# Patient Record
Sex: MALE | Race: ASIAN | NOT HISPANIC OR LATINO | URBAN - METROPOLITAN AREA
[De-identification: names, ages, dates, MRNs, and addresses within clinical notes are randomized per-mention and may not be internally consistent; named-entity substitution may affect disease eponyms.]

---

## 2017-02-02 ENCOUNTER — EMERGENCY (EMERGENCY)
Facility: HOSPITAL | Age: 42
LOS: 1 days | Discharge: HOME | End: 2017-02-02
Attending: EMERGENCY MEDICINE
Payer: MEDICAID

## 2017-02-02 VITALS
RESPIRATION RATE: 16 BRPM | WEIGHT: 182.98 LBS | TEMPERATURE: 98 F | SYSTOLIC BLOOD PRESSURE: 150 MMHG | OXYGEN SATURATION: 100 % | HEIGHT: 67 IN | DIASTOLIC BLOOD PRESSURE: 89 MMHG | HEART RATE: 95 BPM
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
## 2017-02-02 DIAGNOSIS — E11.9 TYPE 2 DIABETES MELLITUS WITHOUT COMPLICATIONS: ICD-10-CM
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
## 2017-02-02 DIAGNOSIS — M79.1 MYALGIA: ICD-10-CM

## 2017-02-02 PROCEDURE — 73030 X-RAY EXAM OF SHOULDER: CPT

## 2017-02-02 PROCEDURE — 99283 EMERGENCY DEPT VISIT LOW MDM: CPT | Mod: 25

## 2017-02-02 PROCEDURE — 73030 X-RAY EXAM OF SHOULDER: CPT | Mod: 26,RT

## 2017-02-02 PROCEDURE — 99283 EMERGENCY DEPT VISIT LOW MDM: CPT

## 2017-02-02 PROCEDURE — 96372 THER/PROPH/DIAG INJ SC/IM: CPT

## 2017-02-02 RX ORDER — KETOROLAC TROMETHAMINE 30 MG/ML
30 SYRINGE (ML) INJECTION ONCE
Qty: 0 | Refills: 0 | Status: DISCONTINUED | OUTPATIENT
Start: 2017-02-02 | End: 2017-02-02

## 2017-02-02 RX ORDER — DICLOFENAC SODIUM 30 MG/G
2 GEL TOPICAL
Qty: 1 | Refills: 0 | OUTPATIENT
Start: 2017-02-02 | End: 2017-02-07

## 2017-02-02 RX ADMIN — Medication 30 MILLIGRAM(S): at 11:53

## 2017-02-02 RX ADMIN — Medication 30 MILLIGRAM(S): at 12:33

## 2017-02-02 NOTE — ED PROVIDER NOTE - NS ED MD SCRIBE ATTENDING SCRIBE SECTIONS
PAST MEDICAL/SURGICAL/SOCIAL HISTORY/PHYSICAL EXAM/HIV/DISPOSITION/HISTORY OF PRESENT ILLNESS/VITAL SIGNS( Pullset)/REVIEW OF SYSTEMS

## 2017-02-02 NOTE — ED PROVIDER NOTE - ATTENDING CONTRIBUTION TO CARE
pateint with fall and muscular pain. on exam full range of motion of both arms, no visible bruising. muscular tenderness to palpation on exam.

## 2017-02-02 NOTE — ED PROVIDER NOTE - PROGRESS NOTE DETAILS
The scribes documentation has been prepared under my direct and personally reviewed by me in its entirety. I confirm that the note above accurately reflects all work, treatment, procedures, and medical decision making performed by me [Deyanira Melchor NP]. Pt confirms moderate improvement, confirmed PMD writing Mobic, pt advised to switch to scheduled IBU if Mobic is not working, discussed xray and follow up with ortho.

## 2017-02-02 NOTE — ED PROVIDER NOTE - OBJECTIVE STATEMENT
43 y/o male with PMHx pf DM presents to the ED c/o upper shoulder pain s/p mechanical fall yesterday. Pt reports he tripped and fell backward on his back and now pt with upper shoulder pain. Pt has not taken any medications for pain relief. Pt went to PMD yesterday who gave him an shot or pain medications to mild relief and a Rx of unknown pain meds to take at home to no relief. Pt denies numbness, tingling, weakness, Hx of CA, or any other complaints. NKDA.

## 2017-02-02 NOTE — ED PROVIDER NOTE - MEDICAL DECISION MAKING DETAILS
Pt with upper shoulder pain s/p fall yesterday. Will get Xray, low suspicion for fracture. Plan to give Toradol, and f/u with PMD. Awaiting what pain meds pt is taking and f/u with pain management. Pt with upper shoulder pain s/p fall yesterday. Will get Xray, low suspicion for fracture or dislocation. Plan to give Toradol, and f/u with PMD. Awaiting what pain meds pt is taking and f/u with pain management.

## 2018-07-22 ENCOUNTER — EMERGENCY (EMERGENCY)
Facility: HOSPITAL | Age: 43
LOS: 1 days | Discharge: HOME | End: 2018-07-22
Attending: EMERGENCY MEDICINE | Admitting: EMERGENCY MEDICINE
Payer: MEDICAID

## 2018-07-22 VITALS
RESPIRATION RATE: 18 BRPM | OXYGEN SATURATION: 100 % | HEART RATE: 89 BPM | SYSTOLIC BLOOD PRESSURE: 116 MMHG | TEMPERATURE: 98 F | DIASTOLIC BLOOD PRESSURE: 75 MMHG

## 2018-07-22 VITALS
TEMPERATURE: 98 F | HEART RATE: 78 BPM | DIASTOLIC BLOOD PRESSURE: 66 MMHG | RESPIRATION RATE: 16 BRPM | SYSTOLIC BLOOD PRESSURE: 136 MMHG | OXYGEN SATURATION: 100 %

## 2018-07-22 PROCEDURE — 73590 X-RAY EXAM OF LOWER LEG: CPT | Mod: 26,RT

## 2018-07-22 PROCEDURE — 73560 X-RAY EXAM OF KNEE 1 OR 2: CPT | Mod: 26,RT

## 2018-07-22 PROCEDURE — 73552 X-RAY EXAM OF FEMUR 2/>: CPT | Mod: 26,RT

## 2018-07-22 PROCEDURE — 99285 EMERGENCY DEPT VISIT HI MDM: CPT | Mod: 25

## 2018-07-22 RX ORDER — OXYCODONE HYDROCHLORIDE 5 MG/1
5 TABLET ORAL ONCE
Qty: 0 | Refills: 0 | Status: DISCONTINUED | OUTPATIENT
Start: 2018-07-22 | End: 2018-07-22

## 2018-07-22 RX ORDER — IBUPROFEN 200 MG
600 TABLET ORAL ONCE
Qty: 0 | Refills: 0 | Status: COMPLETED | OUTPATIENT
Start: 2018-07-22 | End: 2018-07-22

## 2018-07-22 RX ORDER — NICOTINE POLACRILEX 2 MG
1 GUM BUCCAL ONCE
Qty: 0 | Refills: 0 | Status: COMPLETED | OUTPATIENT
Start: 2018-07-22 | End: 2018-07-22

## 2018-07-22 RX ORDER — ACETAMINOPHEN 500 MG
975 TABLET ORAL ONCE
Qty: 0 | Refills: 0 | Status: COMPLETED | OUTPATIENT
Start: 2018-07-22 | End: 2018-07-22

## 2018-07-22 RX ADMIN — Medication 600 MILLIGRAM(S): at 05:46

## 2018-07-22 RX ADMIN — Medication 975 MILLIGRAM(S): at 03:25

## 2018-07-22 RX ADMIN — Medication 1 PATCH: at 11:09

## 2018-07-22 RX ADMIN — Medication 600 MILLIGRAM(S): at 03:25

## 2018-07-22 RX ADMIN — OXYCODONE HYDROCHLORIDE 5 MILLIGRAM(S): 5 TABLET ORAL at 11:14

## 2018-07-22 RX ADMIN — OXYCODONE HYDROCHLORIDE 5 MILLIGRAM(S): 5 TABLET ORAL at 05:46

## 2018-07-22 NOTE — ED PROVIDER NOTE - MEDICAL DECISION MAKING DETAILS
43M presenting with right knee pain s/p trip and fall while intoxicated. Will obtain XR, pain control and reassess

## 2018-07-22 NOTE — ED PROVIDER NOTE - CARE PLAN
Principal Discharge DX:	Patella fracture  Assessment and plan of treatment:	Ortho consulted, pt is to f/u with Ortho OP, Dr. Didier Pickett.

## 2018-07-22 NOTE — ED ADULT TRIAGE NOTE - CHIEF COMPLAINT QUOTE
Pt DENISA MORTENSEN from the street. c/o pain on Right Knee s/p Slip and Fall, swelling noted, Landed on affected area possible dislocated. AOB noted Rubén Hitting his head LOC CP SOB palpitation prior and after the fall

## 2018-07-22 NOTE — ED PROVIDER NOTE - OBJECTIVE STATEMENT
43M with hx of DM presenting with right knee pain. States he was just at a party, had a few drinks and on the way home, tripped and fell onto the right knee. Denies any head trauma, LOC, blood thinners. States he has been able to ambulate on the leg however, pain has been worsening. Has not taken anything for the pain thus far. Denies fever, chills, cp, sob, n/v/d, dizziness, headache, dysuria

## 2018-07-22 NOTE — ED ADULT NURSE NOTE - CHIEF COMPLAINT
The patient is a 43y Male complaining of slip and fall on right knee. Pt c/o right leg pain. Pt reports was at a birthday party and drank a lot of alcohol, unknown amount, last drink 2 hours ago. Denies everyday etoh use. Reports pmhx The patient is a 43y Male complaining of slip and fall on right knee. Pt c/o right leg pain. Pt reports was at a birthday party and drank a lot of alcohol, unknown amount, last drink 2 hours ago. Denies everyday etoh use. Reports pmhx diabetes. The patient is a 43y Male complaining of slip and fall on right knee. Pt c/o right leg pain. Pt reports was at a birthday party and drank a lot of alcohol, unknown amount, last drink 2 hours ago. Denies everyday etoh use. Reports pmhx diabetes, only took morning insulin dose.

## 2018-07-22 NOTE — ED PROVIDER NOTE - PROGRESS NOTE DETAILS
Dr. Phelan: pt signed out to me from Dr. Levi to await ortho recommendations; ortho recommending outpatient follow up, no acute intervention at this time; knee immobilizer placed and pt able to ambulate with crutches in ED

## 2018-07-22 NOTE — ED PROVIDER NOTE - ATTENDING CONTRIBUTION TO CARE
43M p/w R knee pain s/p fall while at a party and having a few drinks, happened a few hours ago.  Mild swelling, (+) deformity, was able to walk on it, good pulses, no other injury.  Here with sister who can take him home.  Found to have patella fx - ortho c/s - they want to wait until 7am when attg phys can make recc's, will also use that time to obs to sobriety, if clinically sober at that point can d/c home with crutches and f/u as outpt.   VS:  unremarkable    GEN -mild distress R knee pain, intoxicated; A+O x3   HEAD - NC/AT     ENT - PEERL, EOMI, mucous membranes  moist , no discharge      NECK: Neck supple, non-tender without lymphadenopathy, no masses, no JVD  PULM - CTA b/l,  symmetric breath sounds  COR -  normal heart sounds    ABD - , ND, NT, soft,  BACK - no CVA tenderness, nontender spine     EXTREMS - no edema, no deformity, warm and well perfused  except for R knee (+)deformity (+)swelling, held flexed, unable to extend.  R hip nontender, pelvis stable, R ankle nontender no deformity, distal N/V/T intact.  skin intact.   SKIN - no rash or bruising      NEUROLOGIC - alert, CN 2-12 intact, sensation nl, motor no focal deficit.

## 2018-07-22 NOTE — CONSULT NOTE ADULT - SUBJECTIVE AND OBJECTIVE BOX
Orthopaedic Surgery Consult Note    Patient is a 43y old  Male who presents with a chief complaint of right knee pain s/p fall while intoxicated. No numbness/tingling or paresthesias. There is significant swelling over patella. He was able to immediately ambulate after the fall but now is in too much pain to bend knee.      PAST MEDICAL & SURGICAL HISTORY:  No pertinent past medical history  No significant past surgical history    [] No significant past history as reviewed with the patient and family    FAMILY HISTORY:    [] Family history not pertinent as reviewed with the patient and family    SOCIAL HISTORY:    MEDICATIONS  (STANDING):    MEDICATIONS  (PRN):    Allergies    No Known Allergies    Intolerances        REVIEW OF SYSTEMS  [x] All review of systems negative except for those marked.      Vital Signs Last 24 Hrs  T(C): 36.6 (22 Jul 2018 01:06), Max: 36.6 (22 Jul 2018 01:06)  T(F): 97.9 (22 Jul 2018 01:06), Max: 97.9 (22 Jul 2018 01:06)  HR: 96 (22 Jul 2018 02:14) (89 - 96)  BP: 125/57 (22 Jul 2018 02:14) (116/75 - 125/57)  BP(mean): --  RR: 18 (22 Jul 2018 02:14) (18 - 18)  SpO2: 100% (22 Jul 2018 02:14) (100% - 100%)      PHYSICAL EXAM:  NAD  RLE:   - skin intact, swelling over medial and lateral aspects of knee with ecchymosis  - Motor TA/EHL/FHL intact  - SILT in L2-S1  - DP/PT palpable  - unable to SLR     IMAGING STUDIES: Right patella fracture    43y Male with right patella fracture  - Patient will require operative fixation of patella  - Patient placed in bulky Valero dressing and knee immobilizer  - Needs to work with PT for crutch training  - If patient is admitted, please obtain pre-op labs and medically clear for surgery  - Otherwise, follow-up as outpatient with Dr. Pickett within 1 week to discuss operative planning Orthopaedic Surgery Consult Note    Patient is a 43y old  Male who presents with a chief complaint of right knee pain s/p fall while intoxicated. No numbness/tingling or paresthesias. There is significant swelling over patella. He was able to immediately ambulate after the fall but now is in too much pain to bend knee.      PAST MEDICAL & SURGICAL HISTORY:  No pertinent past medical history  No significant past surgical history    [] No significant past history as reviewed with the patient and family    FAMILY HISTORY:    [] Family history not pertinent as reviewed with the patient and family    SOCIAL HISTORY:    MEDICATIONS  (STANDING):    MEDICATIONS  (PRN):    Allergies    No Known Allergies    Intolerances        REVIEW OF SYSTEMS  [x] All review of systems negative except for those marked.      Vital Signs Last 24 Hrs  T(C): 36.6 (22 Jul 2018 01:06), Max: 36.6 (22 Jul 2018 01:06)  T(F): 97.9 (22 Jul 2018 01:06), Max: 97.9 (22 Jul 2018 01:06)  HR: 96 (22 Jul 2018 02:14) (89 - 96)  BP: 125/57 (22 Jul 2018 02:14) (116/75 - 125/57)  BP(mean): --  RR: 18 (22 Jul 2018 02:14) (18 - 18)  SpO2: 100% (22 Jul 2018 02:14) (100% - 100%)      PHYSICAL EXAM:  NAD  RLE:   - skin intact, swelling over medial and lateral aspects of knee with ecchymosis  - Motor TA/EHL/FHL intact  - SILT in L2-S1  - DP/PT palpable  - unable to SLR     IMAGING STUDIES: Right patella fracture    43y Male with right patella fracture  - Patient will require operative fixation of patella  - Patient placed in bulky Valero dressing and knee immobilizer  - Crutch training  - Follow-up as outpatient with Dr. Pickett within 1 week to discuss operative planning

## 2018-07-22 NOTE — ED ADULT NURSE NOTE - OBJECTIVE STATEMENT
Float RN: Received pt in trauma A, pt A&Ox3, +AOB, respirations even and unlabored b/l. Abdomen soft, nondistended, nontender. IVL 20g Angiocath placed on right forearm. Awaiting MD nguyễn. Report endorsed to primary RN. Float RN: Received pt in trauma A, pt A&Ox3, +AOB, respirations even and unlabored b/l. Abdomen soft, nondistended, nontender. Pt unable to straighten right knee. IVL 20g Angiocath placed on right forearm. Awaiting MD nguyễn. Report endorsed to primary RN.

## 2018-07-23 PROBLEM — Z00.00 ENCOUNTER FOR PREVENTIVE HEALTH EXAMINATION: Status: ACTIVE | Noted: 2018-07-23

## 2018-07-23 PROBLEM — M25.561 ACUTE PAIN OF RIGHT KNEE: Status: ACTIVE | Noted: 2018-07-23

## 2018-07-24 ENCOUNTER — APPOINTMENT (OUTPATIENT)
Dept: ORTHOPEDIC SURGERY | Facility: CLINIC | Age: 43
End: 2018-07-24
Payer: MEDICAID

## 2018-07-24 VITALS
WEIGHT: 182 LBS | HEIGHT: 66 IN | HEART RATE: 98 BPM | BODY MASS INDEX: 29.25 KG/M2 | SYSTOLIC BLOOD PRESSURE: 146 MMHG | DIASTOLIC BLOOD PRESSURE: 86 MMHG

## 2018-07-24 DIAGNOSIS — Z86.39 PERSONAL HISTORY OF OTHER ENDOCRINE, NUTRITIONAL AND METABOLIC DISEASE: ICD-10-CM

## 2018-07-24 DIAGNOSIS — Z78.9 OTHER SPECIFIED HEALTH STATUS: ICD-10-CM

## 2018-07-24 DIAGNOSIS — F17.200 NICOTINE DEPENDENCE, UNSPECIFIED, UNCOMPLICATED: ICD-10-CM

## 2018-07-24 DIAGNOSIS — M25.561 PAIN IN RIGHT KNEE: ICD-10-CM

## 2018-07-24 PROCEDURE — 99204 OFFICE O/P NEW MOD 45 MIN: CPT

## 2018-07-24 PROCEDURE — 73564 X-RAY EXAM KNEE 4 OR MORE: CPT | Mod: RT

## 2018-07-24 RX ORDER — INSULIN GLARGINE 100 [IU]/ML
100 INJECTION, SOLUTION SUBCUTANEOUS
Refills: 0 | Status: ACTIVE | COMMUNITY

## 2018-07-26 ENCOUNTER — TRANSCRIPTION ENCOUNTER (OUTPATIENT)
Age: 43
End: 2018-07-26

## 2018-07-26 ENCOUNTER — OUTPATIENT (OUTPATIENT)
Dept: OUTPATIENT SERVICES | Facility: HOSPITAL | Age: 43
LOS: 1 days | End: 2018-07-26
Payer: MEDICAID

## 2018-07-26 VITALS
HEIGHT: 66 IN | DIASTOLIC BLOOD PRESSURE: 84 MMHG | TEMPERATURE: 98 F | SYSTOLIC BLOOD PRESSURE: 132 MMHG | OXYGEN SATURATION: 98 % | HEART RATE: 83 BPM | WEIGHT: 184.97 LBS | RESPIRATION RATE: 16 BRPM

## 2018-07-26 DIAGNOSIS — R06.83 SNORING: ICD-10-CM

## 2018-07-26 DIAGNOSIS — S82.044A NONDISPLACED COMMINUTED FRACTURE OF RIGHT PATELLA, INITIAL ENCOUNTER FOR CLOSED FRACTURE: ICD-10-CM

## 2018-07-26 DIAGNOSIS — E11.9 TYPE 2 DIABETES MELLITUS WITHOUT COMPLICATIONS: ICD-10-CM

## 2018-07-26 LAB
BLD GP AB SCN SERPL QL: NEGATIVE — SIGNIFICANT CHANGE UP
BUN SERPL-MCNC: 37 MG/DL — HIGH (ref 7–23)
CALCIUM SERPL-MCNC: 9.4 MG/DL — SIGNIFICANT CHANGE UP (ref 8.4–10.5)
CHLORIDE SERPL-SCNC: 106 MMOL/L — SIGNIFICANT CHANGE UP (ref 98–107)
CO2 SERPL-SCNC: 23 MMOL/L — SIGNIFICANT CHANGE UP (ref 22–31)
CREAT SERPL-MCNC: 2.25 MG/DL — HIGH (ref 0.5–1.3)
GLUCOSE SERPL-MCNC: 130 MG/DL — HIGH (ref 70–99)
HBA1C BLD-MCNC: 6.8 % — HIGH (ref 4–5.6)
HCT VFR BLD CALC: 34.3 % — LOW (ref 39–50)
HGB BLD-MCNC: 10.9 G/DL — LOW (ref 13–17)
MCHC RBC-ENTMCNC: 26.3 PG — LOW (ref 27–34)
MCHC RBC-ENTMCNC: 31.8 % — LOW (ref 32–36)
MCV RBC AUTO: 82.9 FL — SIGNIFICANT CHANGE UP (ref 80–100)
NRBC # FLD: 0 — SIGNIFICANT CHANGE UP
PLATELET # BLD AUTO: 312 K/UL — SIGNIFICANT CHANGE UP (ref 150–400)
PMV BLD: 11.5 FL — SIGNIFICANT CHANGE UP (ref 7–13)
POTASSIUM SERPL-MCNC: 4.6 MMOL/L — SIGNIFICANT CHANGE UP (ref 3.5–5.3)
POTASSIUM SERPL-SCNC: 4.6 MMOL/L — SIGNIFICANT CHANGE UP (ref 3.5–5.3)
RBC # BLD: 4.14 M/UL — LOW (ref 4.2–5.8)
RBC # FLD: 14 % — SIGNIFICANT CHANGE UP (ref 10.3–14.5)
RH IG SCN BLD-IMP: POSITIVE — SIGNIFICANT CHANGE UP
SODIUM SERPL-SCNC: 141 MMOL/L — SIGNIFICANT CHANGE UP (ref 135–145)
WBC # BLD: 8.46 K/UL — SIGNIFICANT CHANGE UP (ref 3.8–10.5)
WBC # FLD AUTO: 8.46 K/UL — SIGNIFICANT CHANGE UP (ref 3.8–10.5)

## 2018-07-26 PROCEDURE — 93010 ELECTROCARDIOGRAM REPORT: CPT

## 2018-07-26 RX ORDER — SODIUM CHLORIDE 9 MG/ML
3 INJECTION INTRAMUSCULAR; INTRAVENOUS; SUBCUTANEOUS EVERY 8 HOURS
Qty: 0 | Refills: 0 | Status: DISCONTINUED | OUTPATIENT
Start: 2018-07-27 | End: 2018-08-11

## 2018-07-26 RX ORDER — SODIUM CHLORIDE 9 MG/ML
1000 INJECTION, SOLUTION INTRAVENOUS
Qty: 0 | Refills: 0 | Status: DISCONTINUED | OUTPATIENT
Start: 2018-07-27 | End: 2018-08-11

## 2018-07-26 NOTE — H&P PST ADULT - NSANTHOSAYNRD_GEN_A_CORE
No. TERESSA screening performed.  STOP BANG Legend: 0-2 = LOW Risk; 3-4 = INTERMEDIATE Risk; 5-8 = HIGH Risk/never been tested

## 2018-07-26 NOTE — H&P PST ADULT - HISTORY OF PRESENT ILLNESS
43 year old male with PMH: diabetes, hyperlipidemia. S/P fall from ladder, denies LOC, fell on right side, on the knee on 7/21/18.  Pt is scheduled for right open reduction internal fixation partial patellectomy on 7/27/18.

## 2018-07-26 NOTE — H&P PST ADULT - PROBLEM SELECTOR PLAN 1
Pt is scheduled for right open reduction internal fixation partial patellectomy on 7/27/18.   Pre-op instructions given, patient verbalized understanding.   Pepcid given to patient for GI prophylaxis.   Chlorohexidine wash provided, instructions given.     Surgeon requested medical evaluation, copy of evaluation in the chart.  Case discussed with Dr. Sharma.

## 2018-07-26 NOTE — H&P PST ADULT - MUSCULOSKELETAL
details… detailed exam joint erythema/right knee/no calf tenderness/decreased ROM due to pain/decreased ROM/joint swelling/diminished strength

## 2018-07-26 NOTE — H&P PST ADULT - PROBLEM SELECTOR PLAN 2
PMH diabetes, OR booking notified.   Pt instructed, night before surgery, reduce Lantus dose by 20% and morning of the surgery, reduce Lantus dose by 50%.  Pt verbalized understanding.

## 2018-07-26 NOTE — H&P PST ADULT - NEGATIVE MUSCULOSKELETAL SYMPTOMS
no arthritis/no muscle cramps/no arm pain L/no back pain/no neck pain/no arm pain R/no myalgia/no stiffness/no muscle weakness/no joint swelling

## 2018-07-26 NOTE — H&P PST ADULT - RS GEN PE MLT RESP DETAILS PC
airway patent/no rales/respirations non-labored/clear to auscultation bilaterally/no rhonchi/breath sounds equal/no wheezes/good air movement

## 2018-07-26 NOTE — H&P PST ADULT - PMH
Diabetes  TYPE II, insulin dependent, 2014  Hyperlipidemia    Nondisplaced comminuted fracture of right patella, initial encounter for closed fracture Diabetes  TYPE II, insulin dependent, 2014  Hyperlipidemia  PCP prescribed statin, non-compliant, refusing to take  Hypertension  not on medication  Nondisplaced comminuted fracture of right patella, initial encounter for closed fracture

## 2018-07-26 NOTE — H&P PST ADULT - NEGATIVE ENMT SYMPTOMS
no hearing difficulty/no ear pain/no nasal discharge/no tinnitus/no vertigo/no sinus symptoms/no nasal congestion/no throat pain/no dysphagia

## 2018-07-26 NOTE — ASU PATIENT PROFILE, ADULT - PMH
Diabetes  TYPE II, insulin dependent, 2014  Hyperlipidemia  PCP prescribed statin, non-compliant, refusing to take  Hypertension  not on medication  Nondisplaced comminuted fracture of right patella, initial encounter for closed fracture

## 2018-07-27 ENCOUNTER — RESULT REVIEW (OUTPATIENT)
Age: 43
End: 2018-07-27

## 2018-07-27 ENCOUNTER — APPOINTMENT (OUTPATIENT)
Dept: ORTHOPEDIC SURGERY | Facility: HOSPITAL | Age: 43
End: 2018-07-27

## 2018-07-27 ENCOUNTER — OUTPATIENT (OUTPATIENT)
Dept: OUTPATIENT SERVICES | Facility: HOSPITAL | Age: 43
LOS: 1 days | Discharge: HOME | End: 2018-07-27
Payer: MEDICAID

## 2018-07-27 VITALS
OXYGEN SATURATION: 100 % | RESPIRATION RATE: 16 BRPM | WEIGHT: 184.97 LBS | DIASTOLIC BLOOD PRESSURE: 84 MMHG | HEART RATE: 87 BPM | TEMPERATURE: 98 F | HEIGHT: 66 IN | SYSTOLIC BLOOD PRESSURE: 123 MMHG

## 2018-07-27 VITALS
DIASTOLIC BLOOD PRESSURE: 78 MMHG | OXYGEN SATURATION: 100 % | RESPIRATION RATE: 18 BRPM | HEART RATE: 90 BPM | SYSTOLIC BLOOD PRESSURE: 177 MMHG | TEMPERATURE: 98 F

## 2018-07-27 DIAGNOSIS — S82.044A NONDISPLACED COMMINUTED FRACTURE OF RIGHT PATELLA, INITIAL ENCOUNTER FOR CLOSED FRACTURE: ICD-10-CM

## 2018-07-27 PROBLEM — E78.5 HYPERLIPIDEMIA, UNSPECIFIED: Chronic | Status: ACTIVE | Noted: 2018-07-26

## 2018-07-27 PROBLEM — I10 ESSENTIAL (PRIMARY) HYPERTENSION: Chronic | Status: ACTIVE | Noted: 2018-07-26

## 2018-07-27 LAB
GLUCOSE BLDC GLUCOMTR-MCNC: 119 MG/DL — HIGH (ref 70–99)
GLUCOSE BLDC GLUCOMTR-MCNC: 88 MG/DL — SIGNIFICANT CHANGE UP (ref 70–99)

## 2018-07-27 PROCEDURE — 27524 TREAT KNEECAP FRACTURE: CPT | Mod: RT

## 2018-07-27 PROCEDURE — 88304 TISSUE EXAM BY PATHOLOGIST: CPT | Mod: 26

## 2018-07-27 PROCEDURE — 88311 DECALCIFY TISSUE: CPT | Mod: 26

## 2018-07-27 RX ORDER — KETOROLAC TROMETHAMINE 30 MG/ML
30 SYRINGE (ML) INJECTION ONCE
Qty: 0 | Refills: 0 | Status: DISCONTINUED | OUTPATIENT
Start: 2018-07-27 | End: 2018-07-27

## 2018-07-27 RX ORDER — LABETALOL HCL 100 MG
10 TABLET ORAL ONCE
Qty: 0 | Refills: 0 | Status: COMPLETED | OUTPATIENT
Start: 2018-07-27 | End: 2018-07-27

## 2018-07-27 RX ORDER — HYDROMORPHONE HYDROCHLORIDE 2 MG/ML
0.5 INJECTION INTRAMUSCULAR; INTRAVENOUS; SUBCUTANEOUS
Qty: 0 | Refills: 0 | Status: DISCONTINUED | OUTPATIENT
Start: 2018-07-27 | End: 2018-07-27

## 2018-07-27 RX ORDER — IBUPROFEN 200 MG
1 TABLET ORAL
Qty: 0 | Refills: 0 | COMMUNITY

## 2018-07-27 RX ORDER — ASPIRIN/CALCIUM CARB/MAGNESIUM 324 MG
1 TABLET ORAL
Qty: 84 | Refills: 0 | OUTPATIENT
Start: 2018-07-27 | End: 2018-09-06

## 2018-07-27 RX ORDER — FENTANYL CITRATE 50 UG/ML
25 INJECTION INTRAVENOUS
Qty: 0 | Refills: 0 | Status: DISCONTINUED | OUTPATIENT
Start: 2018-07-27 | End: 2018-07-27

## 2018-07-27 RX ORDER — HYDROMORPHONE HYDROCHLORIDE 2 MG/ML
1 INJECTION INTRAMUSCULAR; INTRAVENOUS; SUBCUTANEOUS ONCE
Qty: 0 | Refills: 0 | Status: DISCONTINUED | OUTPATIENT
Start: 2018-07-27 | End: 2018-07-27

## 2018-07-27 RX ORDER — ONDANSETRON 8 MG/1
4 TABLET, FILM COATED ORAL ONCE
Qty: 0 | Refills: 0 | Status: DISCONTINUED | OUTPATIENT
Start: 2018-07-27 | End: 2018-08-11

## 2018-07-27 RX ORDER — INSULIN GLARGINE 100 [IU]/ML
28 INJECTION, SOLUTION SUBCUTANEOUS
Qty: 0 | Refills: 0 | COMMUNITY

## 2018-07-27 RX ORDER — OXYCODONE HYDROCHLORIDE 5 MG/1
5 TABLET ORAL ONCE
Qty: 0 | Refills: 0 | Status: DISCONTINUED | OUTPATIENT
Start: 2018-07-27 | End: 2018-07-27

## 2018-07-27 RX ORDER — SODIUM CHLORIDE 9 MG/ML
1000 INJECTION, SOLUTION INTRAVENOUS
Qty: 0 | Refills: 0 | Status: DISCONTINUED | OUTPATIENT
Start: 2018-07-27 | End: 2018-08-11

## 2018-07-27 RX ADMIN — HYDROMORPHONE HYDROCHLORIDE 0.5 MILLIGRAM(S): 2 INJECTION INTRAMUSCULAR; INTRAVENOUS; SUBCUTANEOUS at 10:25

## 2018-07-27 RX ADMIN — FENTANYL CITRATE 25 MICROGRAM(S): 50 INJECTION INTRAVENOUS at 09:49

## 2018-07-27 RX ADMIN — HYDROMORPHONE HYDROCHLORIDE 1 MILLIGRAM(S): 2 INJECTION INTRAMUSCULAR; INTRAVENOUS; SUBCUTANEOUS at 13:46

## 2018-07-27 RX ADMIN — OXYCODONE HYDROCHLORIDE 5 MILLIGRAM(S): 5 TABLET ORAL at 11:24

## 2018-07-27 RX ADMIN — FENTANYL CITRATE 25 MICROGRAM(S): 50 INJECTION INTRAVENOUS at 09:43

## 2018-07-27 RX ADMIN — FENTANYL CITRATE 25 MICROGRAM(S): 50 INJECTION INTRAVENOUS at 10:01

## 2018-07-27 RX ADMIN — HYDROMORPHONE HYDROCHLORIDE 0.5 MILLIGRAM(S): 2 INJECTION INTRAMUSCULAR; INTRAVENOUS; SUBCUTANEOUS at 10:24

## 2018-07-27 RX ADMIN — FENTANYL CITRATE 25 MICROGRAM(S): 50 INJECTION INTRAVENOUS at 09:42

## 2018-07-27 RX ADMIN — Medication 30 MILLIGRAM(S): at 12:45

## 2018-07-27 RX ADMIN — Medication 30 MILLIGRAM(S): at 12:30

## 2018-07-27 RX ADMIN — SODIUM CHLORIDE 100 MILLILITER(S): 9 INJECTION, SOLUTION INTRAVENOUS at 09:28

## 2018-07-27 RX ADMIN — HYDROMORPHONE HYDROCHLORIDE 1 MILLIGRAM(S): 2 INJECTION INTRAMUSCULAR; INTRAVENOUS; SUBCUTANEOUS at 14:01

## 2018-07-27 RX ADMIN — FENTANYL CITRATE 25 MICROGRAM(S): 50 INJECTION INTRAVENOUS at 09:33

## 2018-07-27 RX ADMIN — FENTANYL CITRATE 25 MICROGRAM(S): 50 INJECTION INTRAVENOUS at 09:28

## 2018-07-27 RX ADMIN — Medication 10 MILLIGRAM(S): at 10:40

## 2018-07-27 NOTE — ASU DISCHARGE PLAN (ADULT/PEDIATRIC). - MEDICATION SUMMARY - MEDICATIONS TO TAKE
I will START or STAY ON the medications listed below when I get home from the hospital:    Percocet 5/325 oral tablet  -- 1 tab(s) by mouth every 4 hours, As Needed -for severe pain MDD:6   -- Caution federal law prohibits the transfer of this drug to any person other  than the person for whom it was prescribed.  May cause drowsiness.  Alcohol may intensify this effect.  Use care when operating dangerous machinery.  This prescription cannot be refilled.  This product contains acetaminophen.  Do not use  with any other product containing acetaminophen to prevent possible liver damage.  Using more of this medication than prescribed may cause serious breathing problems.    -- Indication: For Pain    Aspirin Enteric Coated 325 mg oral delayed release tablet  -- 1 tab(s) by mouth 2 times a day for DVT ppx  -- Swallow whole.  Do not crush.  Take with food or milk.    -- Indication: For DVT ppx
diffuse

## 2018-07-27 NOTE — BRIEF OPERATIVE NOTE - PROCEDURE
<<-----Click on this checkbox to enter Procedure Partial right patellectomy  07/27/2018    Active  THANIA

## 2018-07-27 NOTE — ASU DISCHARGE PLAN (ADULT/PEDIATRIC). - NURSING INSTRUCTIONS
Keep extremity elevated, apply ice to reduce pain and swelling - 20 minutes on 20 minutes off. Keep dressing dry and clean. Notify MD if red or pus drainge from incision. Narcotic pain medication may cause nausea or constipation. Take medication with food. Increase fluids and fiber intake. 1 Percocet contains 325mg. of Tylenol (ACETAMINOPHEN) . DO NOT EXCEED 3000mg  of Tylenol in a 24 hour period. Make appointment with MD. Keep extremity elevated, apply ice to reduce pain and swelling - 20 minutes on 20 minutes off. Keep dressing dry and clean. Protect right leg till sensation comes back. Notify MD if red or pus drainge from incision. Narcotic pain medication may cause nausea or constipation. Take medication with food. Increase fluids and fiber intake. 1 Percocet contains 325mg. of Tylenol (ACETAMINOPHEN) . DO NOT EXCEED 3000mg  of Tylenol in a 24 hour period. Make appointment with MD. Keep extremity elevated, apply ice to reduce pain and swelling - 20 minutes on 20 minutes off. Keep dressing dry and clean. Protect right leg till sensation comes back. Notify MD if red or pus drainge from incision. Narcotic pain medication may cause nausea or constipation. Take medication with food. Increase fluids and fiber intake. 1 Percocet contains 325mg. of Tylenol (ACETAMINOPHEN) . DO NOT EXCEED 3000mg  of Tylenol in a 24 hour period. Make appointment with MD. Follow up for blood pressure with primary MD.

## 2018-07-27 NOTE — PROGRESS NOTE ADULT - SUBJECTIVE AND OBJECTIVE BOX
ORTHO ATTENDING POST OP    s/p R knee partial patellectomy  Valero Dressing  [place] locked in extension  WBAT R   LE  elevation  ice  ASA  BID  percocet to vivo  f/u 2 weeks  keep brace and dressing, on, clean and dry  until office visit

## 2018-07-27 NOTE — ASU DISCHARGE PLAN (ADULT/PEDIATRIC). - NOTIFY
Swelling that continues/Fever greater than 101/Increased Irritability or Sluggishness/Numbness, color, or temperature change to extremity/Persistent Nausea and Vomiting/Bleeding that does not stop/Unable to Urinate/Pain not relieved by Medications

## 2018-07-27 NOTE — ASU DISCHARGE PLAN (ADULT/PEDIATRIC). - MEDICATION SUMMARY - MEDICATIONS TO CHANGE
I will SWITCH the dose or number of times a day I take the medications listed below when I get home from the hospital:    ibuprofen 800 mg oral tablet  -- 1 tab(s) by mouth 3 times a day, As Needed

## 2018-07-27 NOTE — ASU DISCHARGE PLAN (ADULT/PEDIATRIC). - SPECIAL INSTRUCTIONS
For pain:  mild pain: tylenol 650 mg every 6 hours as needed**  moderate pain: ibuprofen 400 mg every 6 hours as needed  severe pain: percocet 5/325 mg every 6 hours as needed**    **Make sure daily dose of tylenol/acetaminophen is no greater than 4000 mg from all sources    Ice and elevated right lower extremity

## 2018-07-27 NOTE — ASU DISCHARGE PLAN (ADULT/PEDIATRIC). - FOLLOWUP APPOINTMENT CLINIC/PHYSICIAN
Follow up in 2 weeks with Dr. Valderrama, make an appointment if you do not already have one; leave dressing in place and albert locked in extension

## 2018-07-30 PROBLEM — E11.9 TYPE 2 DIABETES MELLITUS WITHOUT COMPLICATIONS: Chronic | Status: ACTIVE | Noted: 2018-07-26

## 2018-07-30 PROBLEM — S82.044A: Chronic | Status: ACTIVE | Noted: 2018-07-26

## 2018-08-01 LAB — SURGICAL PATHOLOGY STUDY: SIGNIFICANT CHANGE UP

## 2018-08-04 ENCOUNTER — EMERGENCY (EMERGENCY)
Facility: HOSPITAL | Age: 43
LOS: 1 days | Discharge: HOME | End: 2018-08-04
Attending: EMERGENCY MEDICINE | Admitting: EMERGENCY MEDICINE
Payer: MEDICAID

## 2018-08-04 VITALS
RESPIRATION RATE: 16 BRPM | HEART RATE: 118 BPM | TEMPERATURE: 99 F | SYSTOLIC BLOOD PRESSURE: 120 MMHG | OXYGEN SATURATION: 97 % | DIASTOLIC BLOOD PRESSURE: 89 MMHG

## 2018-08-04 LAB
ALBUMIN SERPL ELPH-MCNC: 3.1 G/DL — LOW (ref 3.3–5)
ALBUMIN SERPL ELPH-MCNC: 3.3 G/DL — SIGNIFICANT CHANGE UP (ref 3.3–5)
ALBUMIN SERPL ELPH-MCNC: 3.3 G/DL — SIGNIFICANT CHANGE UP (ref 3.3–5)
ALBUMIN SERPL ELPH-MCNC: 4 G/DL — SIGNIFICANT CHANGE UP (ref 3.3–5)
ALP SERPL-CCNC: 67 U/L — SIGNIFICANT CHANGE UP (ref 40–120)
ALP SERPL-CCNC: 68 U/L — SIGNIFICANT CHANGE UP (ref 40–120)
ALP SERPL-CCNC: 68 U/L — SIGNIFICANT CHANGE UP (ref 40–120)
ALP SERPL-CCNC: 84 U/L — SIGNIFICANT CHANGE UP (ref 40–120)
ALT FLD-CCNC: 14 U/L — SIGNIFICANT CHANGE UP (ref 4–41)
ALT FLD-CCNC: 14 U/L — SIGNIFICANT CHANGE UP (ref 4–41)
ALT FLD-CCNC: 16 U/L — SIGNIFICANT CHANGE UP (ref 4–41)
ALT FLD-CCNC: 19 U/L — SIGNIFICANT CHANGE UP (ref 4–41)
APPEARANCE UR: CLEAR — SIGNIFICANT CHANGE UP
AST SERPL-CCNC: 13 U/L — SIGNIFICANT CHANGE UP (ref 4–40)
AST SERPL-CCNC: 14 U/L — SIGNIFICANT CHANGE UP (ref 4–40)
AST SERPL-CCNC: 14 U/L — SIGNIFICANT CHANGE UP (ref 4–40)
AST SERPL-CCNC: 18 U/L — SIGNIFICANT CHANGE UP (ref 4–40)
B-OH-BUTYR SERPL-SCNC: 0.1 MMOL/L — SIGNIFICANT CHANGE UP (ref 0–0.4)
BASE EXCESS BLDV CALC-SCNC: -5.2 MMOL/L — SIGNIFICANT CHANGE UP
BASE EXCESS BLDV CALC-SCNC: -6.6 MMOL/L — SIGNIFICANT CHANGE UP
BASOPHILS # BLD AUTO: 0.02 K/UL — SIGNIFICANT CHANGE UP (ref 0–0.2)
BASOPHILS NFR BLD AUTO: 0.1 % — SIGNIFICANT CHANGE UP (ref 0–2)
BASOPHILS NFR SPEC: 0 % — SIGNIFICANT CHANGE UP (ref 0–2)
BILIRUB SERPL-MCNC: 0.3 MG/DL — SIGNIFICANT CHANGE UP (ref 0.2–1.2)
BILIRUB SERPL-MCNC: 0.4 MG/DL — SIGNIFICANT CHANGE UP (ref 0.2–1.2)
BILIRUB UR-MCNC: NEGATIVE — SIGNIFICANT CHANGE UP
BLASTS # FLD: 0 % — SIGNIFICANT CHANGE UP (ref 0–0)
BLOOD GAS VENOUS - CREATININE: 1.94 MG/DL — HIGH (ref 0.5–1.3)
BLOOD GAS VENOUS - CREATININE: 2.1 MG/DL — HIGH (ref 0.5–1.3)
BLOOD UR QL VISUAL: NEGATIVE — SIGNIFICANT CHANGE UP
BUN SERPL-MCNC: 50 MG/DL — HIGH (ref 7–23)
BUN SERPL-MCNC: 57 MG/DL — HIGH (ref 7–23)
BUN SERPL-MCNC: 57 MG/DL — HIGH (ref 7–23)
BUN SERPL-MCNC: 58 MG/DL — HIGH (ref 7–23)
CALCIUM SERPL-MCNC: 8.3 MG/DL — LOW (ref 8.4–10.5)
CALCIUM SERPL-MCNC: 8.6 MG/DL — SIGNIFICANT CHANGE UP (ref 8.4–10.5)
CALCIUM SERPL-MCNC: 8.7 MG/DL — SIGNIFICANT CHANGE UP (ref 8.4–10.5)
CALCIUM SERPL-MCNC: 9.3 MG/DL — SIGNIFICANT CHANGE UP (ref 8.4–10.5)
CHLORIDE BLDV-SCNC: 106 MMOL/L — SIGNIFICANT CHANGE UP (ref 96–108)
CHLORIDE BLDV-SCNC: 111 MMOL/L — HIGH (ref 96–108)
CHLORIDE SERPL-SCNC: 100 MMOL/L — SIGNIFICANT CHANGE UP (ref 98–107)
CHLORIDE SERPL-SCNC: 103 MMOL/L — SIGNIFICANT CHANGE UP (ref 98–107)
CHLORIDE SERPL-SCNC: 96 MMOL/L — LOW (ref 98–107)
CHLORIDE SERPL-SCNC: 99 MMOL/L — SIGNIFICANT CHANGE UP (ref 98–107)
CO2 SERPL-SCNC: 17 MMOL/L — LOW (ref 22–31)
CO2 SERPL-SCNC: 19 MMOL/L — LOW (ref 22–31)
CO2 SERPL-SCNC: 19 MMOL/L — LOW (ref 22–31)
CO2 SERPL-SCNC: 20 MMOL/L — LOW (ref 22–31)
COLOR SPEC: SIGNIFICANT CHANGE UP
CREAT SERPL-MCNC: 1.92 MG/DL — HIGH (ref 0.5–1.3)
CREAT SERPL-MCNC: 1.99 MG/DL — HIGH (ref 0.5–1.3)
CREAT SERPL-MCNC: 2.09 MG/DL — HIGH (ref 0.5–1.3)
CREAT SERPL-MCNC: 2.12 MG/DL — HIGH (ref 0.5–1.3)
EOSINOPHIL # BLD AUTO: 0 K/UL — SIGNIFICANT CHANGE UP (ref 0–0.5)
EOSINOPHIL NFR BLD AUTO: 0 % — SIGNIFICANT CHANGE UP (ref 0–6)
EOSINOPHIL NFR FLD: 0 % — SIGNIFICANT CHANGE UP (ref 0–6)
ERYTHROCYTE [SEDIMENTATION RATE] IN BLOOD: 102 MM/HR — HIGH (ref 1–15)
GAS PNL BLDV: 131 MMOL/L — LOW (ref 136–146)
GAS PNL BLDV: 135 MMOL/L — LOW (ref 136–146)
GIANT PLATELETS BLD QL SMEAR: PRESENT — SIGNIFICANT CHANGE UP
GLUCOSE BLDV-MCNC: 287 — HIGH (ref 70–99)
GLUCOSE BLDV-MCNC: 396 — HIGH (ref 70–99)
GLUCOSE SERPL-MCNC: 321 MG/DL — HIGH (ref 70–99)
GLUCOSE SERPL-MCNC: 327 MG/DL — HIGH (ref 70–99)
GLUCOSE SERPL-MCNC: 434 MG/DL — HIGH (ref 70–99)
GLUCOSE SERPL-MCNC: 482 MG/DL — CRITICAL HIGH (ref 70–99)
GLUCOSE UR-MCNC: >1000 — SIGNIFICANT CHANGE UP
HBA1C BLD-MCNC: 7 % — HIGH (ref 4–5.6)
HCO3 BLDV-SCNC: 19 MMOL/L — LOW (ref 20–27)
HCO3 BLDV-SCNC: 20 MMOL/L — SIGNIFICANT CHANGE UP (ref 20–27)
HCT VFR BLD CALC: 32.5 % — LOW (ref 39–50)
HCT VFR BLDV CALC: 29.4 % — LOW (ref 39–51)
HCT VFR BLDV CALC: 29.9 % — LOW (ref 39–51)
HGB BLD-MCNC: 10.4 G/DL — LOW (ref 13–17)
HGB BLDV-MCNC: 9.5 G/DL — LOW (ref 13–17)
HGB BLDV-MCNC: 9.7 G/DL — LOW (ref 13–17)
IMM GRANULOCYTES # BLD AUTO: 0.06 # — SIGNIFICANT CHANGE UP
IMM GRANULOCYTES NFR BLD AUTO: 0.4 % — SIGNIFICANT CHANGE UP (ref 0–1.5)
KETONES UR-MCNC: NEGATIVE — SIGNIFICANT CHANGE UP
LACTATE BLDV-MCNC: 2.3 MMOL/L — HIGH (ref 0.5–2)
LACTATE BLDV-MCNC: 2.4 MMOL/L — HIGH (ref 0.5–2)
LEUKOCYTE ESTERASE UR-ACNC: NEGATIVE — SIGNIFICANT CHANGE UP
LYMPHOCYTES # BLD AUTO: 0.33 K/UL — LOW (ref 1–3.3)
LYMPHOCYTES # BLD AUTO: 2.2 % — LOW (ref 13–44)
LYMPHOCYTES NFR SPEC AUTO: 3.5 % — LOW (ref 13–44)
MCHC RBC-ENTMCNC: 25.6 PG — LOW (ref 27–34)
MCHC RBC-ENTMCNC: 32 % — SIGNIFICANT CHANGE UP (ref 32–36)
MCV RBC AUTO: 80 FL — SIGNIFICANT CHANGE UP (ref 80–100)
METAMYELOCYTES # FLD: 0 % — SIGNIFICANT CHANGE UP (ref 0–1)
MICROCYTES BLD QL: SIGNIFICANT CHANGE UP
MONOCYTES # BLD AUTO: 0.08 K/UL — SIGNIFICANT CHANGE UP (ref 0–0.9)
MONOCYTES NFR BLD AUTO: 0.5 % — LOW (ref 2–14)
MONOCYTES NFR BLD: 0 % — LOW (ref 2–9)
MYELOCYTES NFR BLD: 0 % — SIGNIFICANT CHANGE UP (ref 0–0)
NEUTROPHIL AB SER-ACNC: 95.6 % — HIGH (ref 43–77)
NEUTROPHILS # BLD AUTO: 14.73 K/UL — HIGH (ref 1.8–7.4)
NEUTROPHILS NFR BLD AUTO: 96.8 % — HIGH (ref 43–77)
NEUTS BAND # BLD: 0 % — SIGNIFICANT CHANGE UP (ref 0–6)
NITRITE UR-MCNC: NEGATIVE — SIGNIFICANT CHANGE UP
NRBC # FLD: 0 — SIGNIFICANT CHANGE UP
NT-PROBNP SERPL-SCNC: 30.38 PG/ML — SIGNIFICANT CHANGE UP
OTHER - HEMATOLOGY %: 0 — SIGNIFICANT CHANGE UP
OVALOCYTES BLD QL SMEAR: SLIGHT — SIGNIFICANT CHANGE UP
PCO2 BLDV: 39 MMHG — LOW (ref 41–51)
PCO2 BLDV: 40 MMHG — LOW (ref 41–51)
PH BLDV: 7.3 PH — LOW (ref 7.32–7.43)
PH BLDV: 7.32 PH — SIGNIFICANT CHANGE UP (ref 7.32–7.43)
PH UR: 5.5 — SIGNIFICANT CHANGE UP (ref 4.6–8)
PLATELET # BLD AUTO: 368 K/UL — SIGNIFICANT CHANGE UP (ref 150–400)
PLATELET COUNT - ESTIMATE: NORMAL — SIGNIFICANT CHANGE UP
PMV BLD: 11 FL — SIGNIFICANT CHANGE UP (ref 7–13)
PO2 BLDV: 42 MMHG — HIGH (ref 35–40)
PO2 BLDV: 47 MMHG — HIGH (ref 35–40)
POTASSIUM BLDV-SCNC: 4.6 MMOL/L — HIGH (ref 3.4–4.5)
POTASSIUM BLDV-SCNC: 5.1 MMOL/L — HIGH (ref 3.4–4.5)
POTASSIUM SERPL-MCNC: 4.9 MMOL/L — SIGNIFICANT CHANGE UP (ref 3.5–5.3)
POTASSIUM SERPL-MCNC: 5.3 MMOL/L — SIGNIFICANT CHANGE UP (ref 3.5–5.3)
POTASSIUM SERPL-MCNC: 5.4 MMOL/L — HIGH (ref 3.5–5.3)
POTASSIUM SERPL-MCNC: 5.7 MMOL/L — HIGH (ref 3.5–5.3)
POTASSIUM SERPL-SCNC: 4.9 MMOL/L — SIGNIFICANT CHANGE UP (ref 3.5–5.3)
POTASSIUM SERPL-SCNC: 5.3 MMOL/L — SIGNIFICANT CHANGE UP (ref 3.5–5.3)
POTASSIUM SERPL-SCNC: 5.4 MMOL/L — HIGH (ref 3.5–5.3)
POTASSIUM SERPL-SCNC: 5.7 MMOL/L — HIGH (ref 3.5–5.3)
PROMYELOCYTES # FLD: 0 % — SIGNIFICANT CHANGE UP (ref 0–0)
PROT SERPL-MCNC: 6.5 G/DL — SIGNIFICANT CHANGE UP (ref 6–8.3)
PROT SERPL-MCNC: 6.7 G/DL — SIGNIFICANT CHANGE UP (ref 6–8.3)
PROT SERPL-MCNC: 6.7 G/DL — SIGNIFICANT CHANGE UP (ref 6–8.3)
PROT SERPL-MCNC: 8.1 G/DL — SIGNIFICANT CHANGE UP (ref 6–8.3)
PROT UR-MCNC: 30 MG/DL — HIGH
RBC # BLD: 4.06 M/UL — LOW (ref 4.2–5.8)
RBC # FLD: 13.4 % — SIGNIFICANT CHANGE UP (ref 10.3–14.5)
RBC CASTS # UR COMP ASSIST: SIGNIFICANT CHANGE UP (ref 0–?)
SAO2 % BLDV: 68.9 % — SIGNIFICANT CHANGE UP (ref 60–85)
SAO2 % BLDV: 75.9 % — SIGNIFICANT CHANGE UP (ref 60–85)
SODIUM SERPL-SCNC: 131 MMOL/L — LOW (ref 135–145)
SODIUM SERPL-SCNC: 133 MMOL/L — LOW (ref 135–145)
SODIUM SERPL-SCNC: 134 MMOL/L — LOW (ref 135–145)
SODIUM SERPL-SCNC: 136 MMOL/L — SIGNIFICANT CHANGE UP (ref 135–145)
SP GR SPEC: 1.01 — SIGNIFICANT CHANGE UP (ref 1–1.04)
SQUAMOUS # UR AUTO: SIGNIFICANT CHANGE UP
UROBILINOGEN FLD QL: NORMAL MG/DL — SIGNIFICANT CHANGE UP
VARIANT LYMPHS # BLD: 0.9 % — SIGNIFICANT CHANGE UP
WBC # BLD: 15.22 K/UL — HIGH (ref 3.8–10.5)
WBC # FLD AUTO: 15.22 K/UL — HIGH (ref 3.8–10.5)
WBC UR QL: SIGNIFICANT CHANGE UP (ref 0–?)

## 2018-08-04 PROCEDURE — 99285 EMERGENCY DEPT VISIT HI MDM: CPT | Mod: GC

## 2018-08-04 PROCEDURE — 99218: CPT

## 2018-08-04 PROCEDURE — 71045 X-RAY EXAM CHEST 1 VIEW: CPT | Mod: 26

## 2018-08-04 PROCEDURE — 93971 EXTREMITY STUDY: CPT | Mod: 26,RT

## 2018-08-04 RX ORDER — KETOROLAC TROMETHAMINE 30 MG/ML
30 SYRINGE (ML) INJECTION ONCE
Qty: 0 | Refills: 0 | Status: DISCONTINUED | OUTPATIENT
Start: 2018-08-04 | End: 2018-08-04

## 2018-08-04 RX ORDER — SODIUM CHLORIDE 9 MG/ML
1000 INJECTION INTRAMUSCULAR; INTRAVENOUS; SUBCUTANEOUS ONCE
Qty: 0 | Refills: 0 | Status: COMPLETED | OUTPATIENT
Start: 2018-08-04 | End: 2018-08-04

## 2018-08-04 RX ORDER — DEXTROSE 50 % IN WATER 50 %
15 SYRINGE (ML) INTRAVENOUS ONCE
Qty: 0 | Refills: 0 | Status: DISCONTINUED | OUTPATIENT
Start: 2018-08-04 | End: 2018-08-08

## 2018-08-04 RX ORDER — FAMOTIDINE 10 MG/ML
20 INJECTION INTRAVENOUS ONCE
Qty: 0 | Refills: 0 | Status: COMPLETED | OUTPATIENT
Start: 2018-08-04 | End: 2018-08-04

## 2018-08-04 RX ORDER — INSULIN LISPRO 100/ML
VIAL (ML) SUBCUTANEOUS AT BEDTIME
Qty: 0 | Refills: 0 | Status: DISCONTINUED | OUTPATIENT
Start: 2018-08-04 | End: 2018-08-08

## 2018-08-04 RX ORDER — EPINEPHRINE 0.3 MG/.3ML
0.3 INJECTION INTRAMUSCULAR; SUBCUTANEOUS ONCE
Qty: 0 | Refills: 0 | Status: COMPLETED | OUTPATIENT
Start: 2018-08-04 | End: 2018-08-04

## 2018-08-04 RX ORDER — DIPHENHYDRAMINE HCL 50 MG
25 CAPSULE ORAL EVERY 8 HOURS
Qty: 0 | Refills: 0 | Status: DISCONTINUED | OUTPATIENT
Start: 2018-08-04 | End: 2018-08-08

## 2018-08-04 RX ORDER — DIPHENHYDRAMINE HCL 50 MG
50 CAPSULE ORAL ONCE
Qty: 0 | Refills: 0 | Status: COMPLETED | OUTPATIENT
Start: 2018-08-04 | End: 2018-08-04

## 2018-08-04 RX ORDER — INSULIN GLARGINE 100 [IU]/ML
28 INJECTION, SOLUTION SUBCUTANEOUS AT BEDTIME
Qty: 0 | Refills: 0 | Status: DISCONTINUED | OUTPATIENT
Start: 2018-08-04 | End: 2018-08-08

## 2018-08-04 RX ORDER — SODIUM CHLORIDE 9 MG/ML
1000 INJECTION INTRAMUSCULAR; INTRAVENOUS; SUBCUTANEOUS
Qty: 0 | Refills: 0 | Status: DISCONTINUED | OUTPATIENT
Start: 2018-08-04 | End: 2018-08-08

## 2018-08-04 RX ORDER — INSULIN LISPRO 100/ML
10 VIAL (ML) SUBCUTANEOUS ONCE
Qty: 0 | Refills: 0 | Status: COMPLETED | OUTPATIENT
Start: 2018-08-04 | End: 2018-08-04

## 2018-08-04 RX ORDER — INSULIN LISPRO 100/ML
6 VIAL (ML) SUBCUTANEOUS ONCE
Qty: 0 | Refills: 0 | Status: COMPLETED | OUTPATIENT
Start: 2018-08-04 | End: 2018-08-04

## 2018-08-04 RX ORDER — SODIUM CHLORIDE 9 MG/ML
1000 INJECTION, SOLUTION INTRAVENOUS
Qty: 0 | Refills: 0 | Status: DISCONTINUED | OUTPATIENT
Start: 2018-08-04 | End: 2018-08-08

## 2018-08-04 RX ORDER — GLUCAGON INJECTION, SOLUTION 0.5 MG/.1ML
1 INJECTION, SOLUTION SUBCUTANEOUS ONCE
Qty: 0 | Refills: 0 | Status: DISCONTINUED | OUTPATIENT
Start: 2018-08-04 | End: 2018-08-08

## 2018-08-04 RX ORDER — DEXTROSE 50 % IN WATER 50 %
25 SYRINGE (ML) INTRAVENOUS ONCE
Qty: 0 | Refills: 0 | Status: DISCONTINUED | OUTPATIENT
Start: 2018-08-04 | End: 2018-08-08

## 2018-08-04 RX ORDER — FAMOTIDINE 10 MG/ML
20 INJECTION INTRAVENOUS EVERY 12 HOURS
Qty: 0 | Refills: 0 | Status: DISCONTINUED | OUTPATIENT
Start: 2018-08-04 | End: 2018-08-08

## 2018-08-04 RX ORDER — INSULIN LISPRO 100/ML
VIAL (ML) SUBCUTANEOUS
Qty: 0 | Refills: 0 | Status: DISCONTINUED | OUTPATIENT
Start: 2018-08-04 | End: 2018-08-08

## 2018-08-04 RX ORDER — DEXTROSE 50 % IN WATER 50 %
12.5 SYRINGE (ML) INTRAVENOUS ONCE
Qty: 0 | Refills: 0 | Status: DISCONTINUED | OUTPATIENT
Start: 2018-08-04 | End: 2018-08-08

## 2018-08-04 RX ADMIN — Medication 6 UNIT(S): at 13:55

## 2018-08-04 RX ADMIN — Medication 60 MILLIGRAM(S): at 18:04

## 2018-08-04 RX ADMIN — Medication 25 MILLIGRAM(S): at 13:48

## 2018-08-04 RX ADMIN — EPINEPHRINE 0.3 MILLIGRAM(S): 0.3 INJECTION INTRAMUSCULAR; SUBCUTANEOUS at 06:54

## 2018-08-04 RX ADMIN — SODIUM CHLORIDE 1000 MILLILITER(S): 9 INJECTION INTRAMUSCULAR; INTRAVENOUS; SUBCUTANEOUS at 08:17

## 2018-08-04 RX ADMIN — Medication 10 UNIT(S): at 17:39

## 2018-08-04 RX ADMIN — Medication 30 MILLIGRAM(S): at 14:23

## 2018-08-04 RX ADMIN — SODIUM CHLORIDE 125 MILLILITER(S): 9 INJECTION INTRAMUSCULAR; INTRAVENOUS; SUBCUTANEOUS at 13:48

## 2018-08-04 RX ADMIN — Medication 50 MILLIGRAM(S): at 06:54

## 2018-08-04 RX ADMIN — SODIUM CHLORIDE 1000 MILLILITER(S): 9 INJECTION INTRAMUSCULAR; INTRAVENOUS; SUBCUTANEOUS at 21:31

## 2018-08-04 RX ADMIN — INSULIN GLARGINE 28 UNIT(S): 100 INJECTION, SOLUTION SUBCUTANEOUS at 21:43

## 2018-08-04 RX ADMIN — FAMOTIDINE 20 MILLIGRAM(S): 10 INJECTION INTRAVENOUS at 18:04

## 2018-08-04 RX ADMIN — Medication 125 MILLIGRAM(S): at 06:55

## 2018-08-04 RX ADMIN — Medication 30 MILLIGRAM(S): at 13:48

## 2018-08-04 RX ADMIN — SODIUM CHLORIDE 1000 MILLILITER(S): 9 INJECTION INTRAMUSCULAR; INTRAVENOUS; SUBCUTANEOUS at 09:49

## 2018-08-04 RX ADMIN — Medication 4: at 21:27

## 2018-08-04 RX ADMIN — Medication 25 MILLIGRAM(S): at 21:27

## 2018-08-04 RX ADMIN — FAMOTIDINE 20 MILLIGRAM(S): 10 INJECTION INTRAVENOUS at 06:55

## 2018-08-04 NOTE — ED PROVIDER NOTE - OBJECTIVE STATEMENT
43M recently d/c'd after admission for knee surgery for fx s/p fall from ladder, started on new medications for HTN and HLD including ramipril p/w diffuse hive like, pruritic rash, RUE swelling, and lip swelling since yesterday. No fever/chills. No throat closing sensation. No SOB. No drooling or dysphagia. Did not take any medications prior to arrival.

## 2018-08-04 NOTE — ED PROVIDER NOTE - PROGRESS NOTE DETAILS
Kimberli FRANCES, PGY-4: pt signed out to me. Pt resting comfortably, reports pruritus/hives have improved, lip swelling improved. arm swelling unchanged. Arm swelling started before pt took ramipril/simvastatin. Upper extremity duplex pending.

## 2018-08-04 NOTE — ED ADULT TRIAGE NOTE - CHIEF COMPLAINT QUOTE
pt arrives w/ c/o allergic reaction to arm and face. pt with urticaria to bilateral arms. pt with swelling to top lip. pt with recent right knee surgery last Friday. pt states gvn simvastatin, percocet, ramipril, and nicotine patch which are new meds for him. pt appears comfortable no signs of drooling or respiratory distress.

## 2018-08-04 NOTE — ED PROVIDER NOTE - ATTENDING CONTRIBUTION TO CARE
Emely: 42 yo male s/p recent hospitalization for knee repair in which he was started on several new meds for blood pressure including ramipril c/o facial/oral swelling and diffuse rash x 3 days. No associated fevers or chills. Pt also c/o RUE swelling that also began yesterday. NO SOB or difficulty swallowing. + lip swelling. No nausea or vomiting. Exam: GENERAL: well appearing, NAD, HEENT: + upper lips swelling, no oropharyngeal edema, MMM, PERRLA, CARDIO: + regular tachycardia, LUNGS: CTA B/L, no wheezing, rales or rhonchi, ABD: soft, nontender, BSx4 quadrants, no guarding or rigidity. EXT: No LE edema or calf TTP, 2+ distal pulses x 4 extremities. NEURO: AxOx3, SKIN: diffuse urticaria A/P- 42 yo male with allergic reaction/angioedema. will give steroids, pepcid, benadryl, and epinephrine. will observe.

## 2018-08-04 NOTE — ED ADULT NURSE REASSESSMENT NOTE - NS ED NURSE REASSESS COMMENT FT1
received report from nite shift/ pt in room 6/ pt a&0 3/ rt leg in brace/  iv in rt hand / pts arms swollen/ unable to access labs at this time/ pt c/o feeling warm in room/  no air conditioning on in rm 6/ pt to ultrasound

## 2018-08-04 NOTE — ED CDU PROVIDER INITIAL DAY NOTE - MEDICAL DECISION MAKING DETAILS
43 yr old male possibly allergic reaction from meds vs inflammation. monitor in cdu, esr, cardiac monitor, keep arm elevated, pain control 43 yr old male hx of DM, HTN, HLD possibly allergic reaction from meds vs inflammation. monitor in cdu, esr, cardiac monitor, keep arm elevated, pain control. revital/ reassess after epinephrine worn off and once hand pain well controlled.

## 2018-08-04 NOTE — ED CDU PROVIDER INITIAL DAY NOTE - ENMT, MLM
Airway patent. Nasal mucosa clear. Mouth with normal mucosa. Throat has no vesicles, no oropharyngeal exudates and uvula is midline. no tongue swelling.  left upper lip minimal swelling.

## 2018-08-04 NOTE — ED PROVIDER NOTE - MEDICAL DECISION MAKING DETAILS
43M with hive like rash and swelling s/p started on new medications including ace inhibitor. angioedema vs allergic rxn vs both. Given degree of involvement will give epi, steroids, benadryl, pepcid, monitor x 6 hrs. arm swelling likely allergic in nature but given recent hospitalization will obtain RUE sono r/o DVT as well.

## 2018-08-04 NOTE — ED CDU PROVIDER INITIAL DAY NOTE - PROGRESS NOTE DETAILS
Garcia: left arm swelling significantly improve per pt and also at right.  Still visible RUE swelling no progression of sx.  radial pulse 2+, no pain out of proportion. no fever, no loss of mobility.  FS elevated due to steroids and epi. pt afebrile. mild increase in creatinine but is receiving maintenance fluids. received insulin for high glucose- will monitor for dka. continue with arm elevation.  monitor overnight. LINDSAY Pina: pt resting comfortably in bed NAD, pt states he feels better facial swelling improved.  RUE will mild swelling FROM, NV Intact.  U/S negative for DVT.  Will continue to monitor.

## 2018-08-04 NOTE — ED CDU PROVIDER INITIAL DAY NOTE - MUSCULOSKELETAL, MLM
Spine appears normal, range of motion is not limited, no muscle or joint tenderness- ace wrap and knee immobilizer at right leg 2+ DP, cap refill < 2sec.  RUE- non-pitting circumferential swelling from digits to elbow without underlying skin changes or breakdown. left arm- minimal swelling, unable to make a tight fist Spine appears normal, range of motion is not limited, no muscle or joint tenderness- ace wrap and knee immobilizer at right leg 2+ DP, cap refill < 2sec.  RUE- non-pitting circumferential swelling from digits to elbow without underlying skin changes or breakdown. unable to make a tight fist. left arm- minimal swelling

## 2018-08-04 NOTE — ED ADULT NURSE NOTE - OBJECTIVE STATEMENT
43yom A&ox4 amb presents to ed 6 c/o allergic reaction. pt was recently started on multiple new medications on friday. today presents w/ facial edema as well as edema to b/l upper extremities. R arm/hand notably more swollen than L. both arms presents w/ redness. pt denies airway involvement. no tongue/throat swelling. no SOB, drooling. pt tachycardiac upon arrival to 130. denies any chest pain, n/v/d. 20g iv lock placed to L hand. pt medicated per orders. pt sinus tachy on cardiac monitor. will continue to monitor.

## 2018-08-04 NOTE — ED ADULT NURSE NOTE - NSIMPLEMENTINTERV_GEN_ALL_ED
Implemented All Universal Safety Interventions:  [place] to call system. Call bell, personal items and telephone within reach. Instruct patient to call for assistance. Room bathroom lighting operational. Non-slip footwear when patient is off stretcher. Physically safe environment: no spills, clutter or unnecessary equipment. Stretcher in lowest position, wheels locked, appropriate side rails in place.

## 2018-08-04 NOTE — ED CDU PROVIDER INITIAL DAY NOTE - OBJECTIVE STATEMENT
43 yr old Male recently d/c'd after admission for knee surgery for fx s/p fall from ladder, started on new medications for HTN and HLD including ramipril p/w diffuse hive like, pruritic rash, RUE swelling, and lip swelling x 1 day.  No fever/chills. No throat closing sensation. No SOB, no sti, no tick exposure, No drooling or dysphagia. Did not take any medications prior to arrival.  no new pets, detergent, food. sent to cdu for observation due to persistent tachycardia and arm swelling with normal RUE dvt study.  itch resolved with minimal left upper lip swelling sensation otherwise still c/o bilateral hand stiffness. 43 yr old Male hx of DM, HTN, HLD, recently d/c'd after admission for knee surgery for patella fx s/p fall from ladder, started on new medications for HTN and HLD including ramipril p/w diffuse hive like, pruritic rash, RUE swelling, and lip swelling x 1 day.  No fever/chills. No throat closing sensation. No SOB, no sti, no tick exposure, No drooling or dysphagia. Did not take any medications prior to arrival.  no new pets, detergent, food. sent to cdu for observation due to persistent tachycardia and arm swelling with normal RUE dvt study.  itch resolved with minimal left upper lip swelling sensation otherwise still c/o bilateral hand stiffness. No fever, chills, CP, sob, palpitations, cough, recent travel. 43 yr old Male hx of DM, HTN, HLD, recently d/c'd after admission for knee surgery for patella fx s/p fall from ladder, started on new medications for HTN and HLD including ramipril p/w diffuse hive like, pruritic rash, RUE swelling, and lip swelling x 1 day.  No fever/chills. No throat closing sensation. No SOB, no sti, no tick exposure, No drooling or dysphagia. Did not take any medications prior to arrival.  no new pets, detergent, food. sent to cdu for observation due to persistent tachycardia and arm swelling with normal RUE dvt study.  itch resolved with minimal left upper lip swelling sensation otherwise still c/o bilateral hand stiffness. No CP, sob, palpitations, cough, recent travel. No known allergies.

## 2018-08-05 VITALS
TEMPERATURE: 98 F | OXYGEN SATURATION: 100 % | HEART RATE: 71 BPM | DIASTOLIC BLOOD PRESSURE: 70 MMHG | SYSTOLIC BLOOD PRESSURE: 142 MMHG | RESPIRATION RATE: 16 BRPM

## 2018-08-05 DIAGNOSIS — E11.65 TYPE 2 DIABETES MELLITUS WITH HYPERGLYCEMIA: ICD-10-CM

## 2018-08-05 DIAGNOSIS — E78.4 OTHER HYPERLIPIDEMIA: ICD-10-CM

## 2018-08-05 DIAGNOSIS — I10 ESSENTIAL (PRIMARY) HYPERTENSION: ICD-10-CM

## 2018-08-05 LAB
ALBUMIN SERPL ELPH-MCNC: 3.3 G/DL — SIGNIFICANT CHANGE UP (ref 3.3–5)
ALP SERPL-CCNC: 65 U/L — SIGNIFICANT CHANGE UP (ref 40–120)
ALT FLD-CCNC: 14 U/L — SIGNIFICANT CHANGE UP (ref 4–41)
AST SERPL-CCNC: 10 U/L — SIGNIFICANT CHANGE UP (ref 4–40)
BASE EXCESS BLDV CALC-SCNC: -3.4 MMOL/L — SIGNIFICANT CHANGE UP
BASOPHILS # BLD AUTO: 0.01 K/UL — SIGNIFICANT CHANGE UP (ref 0–0.2)
BASOPHILS NFR BLD AUTO: 0.1 % — SIGNIFICANT CHANGE UP (ref 0–2)
BILIRUB SERPL-MCNC: 0.3 MG/DL — SIGNIFICANT CHANGE UP (ref 0.2–1.2)
BLOOD GAS VENOUS - CREATININE: 1.66 MG/DL — HIGH (ref 0.5–1.3)
BUN SERPL-MCNC: 54 MG/DL — HIGH (ref 7–23)
CALCIUM SERPL-MCNC: 8.6 MG/DL — SIGNIFICANT CHANGE UP (ref 8.4–10.5)
CHLORIDE BLDV-SCNC: 113 MMOL/L — HIGH (ref 96–108)
CHLORIDE SERPL-SCNC: 109 MMOL/L — HIGH (ref 98–107)
CO2 SERPL-SCNC: 21 MMOL/L — LOW (ref 22–31)
CREAT SERPL-MCNC: 1.75 MG/DL — HIGH (ref 0.5–1.3)
EOSINOPHIL # BLD AUTO: 0 K/UL — SIGNIFICANT CHANGE UP (ref 0–0.5)
EOSINOPHIL NFR BLD AUTO: 0 % — SIGNIFICANT CHANGE UP (ref 0–6)
GAS PNL BLDV: 138 MMOL/L — SIGNIFICANT CHANGE UP (ref 136–146)
GLUCOSE BLDV-MCNC: 248 — HIGH (ref 70–99)
GLUCOSE SERPL-MCNC: 276 MG/DL — HIGH (ref 70–99)
HCO3 BLDV-SCNC: 21 MMOL/L — SIGNIFICANT CHANGE UP (ref 20–27)
HCT VFR BLD CALC: 29.2 % — LOW (ref 39–50)
HCT VFR BLDV CALC: 29.4 % — LOW (ref 39–51)
HGB BLD-MCNC: 9.3 G/DL — LOW (ref 13–17)
HGB BLDV-MCNC: 9.5 G/DL — LOW (ref 13–17)
IMM GRANULOCYTES # BLD AUTO: 0.08 # — SIGNIFICANT CHANGE UP
IMM GRANULOCYTES NFR BLD AUTO: 0.6 % — SIGNIFICANT CHANGE UP (ref 0–1.5)
LACTATE BLDV-MCNC: 1.5 MMOL/L — SIGNIFICANT CHANGE UP (ref 0.5–2)
LYMPHOCYTES # BLD AUTO: 0.88 K/UL — LOW (ref 1–3.3)
LYMPHOCYTES # BLD AUTO: 6.7 % — LOW (ref 13–44)
MCHC RBC-ENTMCNC: 26.2 PG — LOW (ref 27–34)
MCHC RBC-ENTMCNC: 31.8 % — LOW (ref 32–36)
MCV RBC AUTO: 82.3 FL — SIGNIFICANT CHANGE UP (ref 80–100)
MONOCYTES # BLD AUTO: 0.44 K/UL — SIGNIFICANT CHANGE UP (ref 0–0.9)
MONOCYTES NFR BLD AUTO: 3.3 % — SIGNIFICANT CHANGE UP (ref 2–14)
NEUTROPHILS # BLD AUTO: 11.8 K/UL — HIGH (ref 1.8–7.4)
NEUTROPHILS NFR BLD AUTO: 89.3 % — HIGH (ref 43–77)
NRBC # FLD: 0 — SIGNIFICANT CHANGE UP
PCO2 BLDV: 44 MMHG — SIGNIFICANT CHANGE UP (ref 41–51)
PH BLDV: 7.32 PH — SIGNIFICANT CHANGE UP (ref 7.32–7.43)
PLATELET # BLD AUTO: 328 K/UL — SIGNIFICANT CHANGE UP (ref 150–400)
PMV BLD: 11.6 FL — SIGNIFICANT CHANGE UP (ref 7–13)
PO2 BLDV: 35 MMHG — SIGNIFICANT CHANGE UP (ref 35–40)
POTASSIUM BLDV-SCNC: 4.7 MMOL/L — HIGH (ref 3.4–4.5)
POTASSIUM SERPL-MCNC: 4.9 MMOL/L — SIGNIFICANT CHANGE UP (ref 3.5–5.3)
POTASSIUM SERPL-SCNC: 4.9 MMOL/L — SIGNIFICANT CHANGE UP (ref 3.5–5.3)
PROT SERPL-MCNC: 6.4 G/DL — SIGNIFICANT CHANGE UP (ref 6–8.3)
RBC # BLD: 3.55 M/UL — LOW (ref 4.2–5.8)
RBC # FLD: 13.5 % — SIGNIFICANT CHANGE UP (ref 10.3–14.5)
SAO2 % BLDV: 57.2 % — LOW (ref 60–85)
SODIUM SERPL-SCNC: 141 MMOL/L — SIGNIFICANT CHANGE UP (ref 135–145)
WBC # BLD: 13.21 K/UL — HIGH (ref 3.8–10.5)
WBC # FLD AUTO: 13.21 K/UL — HIGH (ref 3.8–10.5)

## 2018-08-05 PROCEDURE — 99217: CPT

## 2018-08-05 RX ORDER — ENOXAPARIN SODIUM 100 MG/ML
28 INJECTION SUBCUTANEOUS
Qty: 1 | Refills: 0 | OUTPATIENT
Start: 2018-08-05 | End: 2018-09-03

## 2018-08-05 RX ORDER — IBUPROFEN 200 MG
600 TABLET ORAL ONCE
Qty: 0 | Refills: 0 | Status: COMPLETED | OUTPATIENT
Start: 2018-08-05 | End: 2018-08-05

## 2018-08-05 RX ORDER — AMLODIPINE BESYLATE 2.5 MG/1
1 TABLET ORAL
Qty: 30 | Refills: 0 | OUTPATIENT
Start: 2018-08-05 | End: 2018-09-03

## 2018-08-05 RX ORDER — INSULIN LISPRO 100/ML
14 VIAL (ML) SUBCUTANEOUS
Qty: 1 | Refills: 0 | OUTPATIENT
Start: 2018-08-05

## 2018-08-05 RX ADMIN — Medication 600 MILLIGRAM(S): at 15:00

## 2018-08-05 RX ADMIN — Medication 25 MILLIGRAM(S): at 06:24

## 2018-08-05 RX ADMIN — Medication 4: at 13:21

## 2018-08-05 RX ADMIN — Medication 25 MILLIGRAM(S): at 13:44

## 2018-08-05 RX ADMIN — Medication 6: at 09:35

## 2018-08-05 RX ADMIN — FAMOTIDINE 20 MILLIGRAM(S): 10 INJECTION INTRAVENOUS at 06:29

## 2018-08-05 NOTE — CONSULT NOTE ADULT - ATTENDING COMMENTS
Agree with assessment and plan as above by Dr. Kirkpatrick. Reviewed all pertinent labs, glucose values, and imaging studies. Modifications made as indicated above.     Robert Kemp D.O  [phone]

## 2018-08-05 NOTE — ED CDU PROVIDER DISPOSITION NOTE - PLAN OF CARE
See your primary care doctor within 24-48 hours, bring copies of all reports with you.   STOP taking Ramipril, start Norvasc 5mg daily for your blood pressure instead. Start Prednisone 50mg one tab once a day for 5 more days. Your insulin will be adjusted when taking Prednisone: take Humalog 14 units three times a day before meals and Lantus 28 units at bedtime. WHEN YOU ARE DONE TAKING STEROIDS, take Humalog 9 units three times a day before meals and Lantus 28 units at bedtime.   Return to the ER for worsening symptoms or any other concerns.

## 2018-08-05 NOTE — CONSULT NOTE ADULT - ASSESSMENT
43 yr M with T2DM A1c 7.0 controlled c/b neuropathy and CKD-3 here with angioedema likely due to ramipril. Now with steroid exacerbated hyperglycemia. patient will be discharged today on a course of prednisone 50mg daily for 5 days. 43 yr M with T2DM A1c 7.0 controlled c/b neuropathy and CKD-3 here with angioedema likely due to ramipril. Now with steroid exacerbated hyperglycemia glucose values >500 patient will be discharged today on a course of prednisone 50mg daily for 5 days. 43 yr M with T2DM A1c 7.0 controlled c/b neuropathy and CKD-3 here with angioedema likely due to ramipril. Now with steroid exacerbated hyperglycemia glucose values >500 patient will be discharged today on a course of prednisone 50mg daily for 5 days (high risk patient with high level decision-making).

## 2018-08-05 NOTE — CONSULT NOTE ADULT - PROBLEM SELECTOR RECOMMENDATION 2
Would discontinue Ramipril  Patient can consider starting amlodipine if BP remains uncontrolled Would discontinue Ramipril  Patient can consider starting amlodipine if BP remains uncontrolled  Follow-up with nephrology

## 2018-08-05 NOTE — ED CDU PROVIDER SUBSEQUENT DAY NOTE - ATTENDING CONTRIBUTION TO CARE
Dr. Phelan: 42 yo male with HTN, DM on insulin, recently had patella fracture repair, recently started  on ramipril, in ED with hives, upper lip swelling and swelling to right arm.  Treated with steroids in ED and had negative right UE DVT study.  Never had airway compromise.  Placed in CDU for monitoring.  This morning on my eval pt feeling improvement, states swelling to right arm and lips is improved, no longer has hives.  Breathing comfortably.  On exam pt well appearing, in NAD, heart RRR, lungs CTAB, abd NTND, strength 5/5 in all extremities and skin without rash.  Right arm mild generalized swelling compared to left arm but no erythema, no rash, no skin breaks and pt still with full ROM to right shoulder/elbow/wrist/hand with strong radial pulse.  Compartments soft.  Throat clear, no stridor.  Will see endocrinology to assist with blood sugar management now that FS significantly elevated s/p steroid.

## 2018-08-05 NOTE — ED CDU PROVIDER SUBSEQUENT DAY NOTE - MEDICAL DECISION MAKING DETAILS
42 y/o male with a hx of DM, HTN, HLD placed in CDU for possible allergic reaction pt presented with upper lip swelling and right arm swelling u/s negative no DVT, tx for possible allergic reaction.    Elevated Glucose IVF, insulin, reassess.  Repeat labs.

## 2018-08-05 NOTE — ED CDU PROVIDER SUBSEQUENT DAY NOTE - HISTORY
LINDSAY Pina: pt resting comfortably in bed NAD, pt states he feels well denies any complaints.  Repeat labs reviewed with Dr Wade will continue IVF and repeat labs in AM.  Will continue to monitor.

## 2018-08-05 NOTE — ED CDU PROVIDER DISPOSITION NOTE - ATTENDING CONTRIBUTION TO CARE
Dr. Phelan Discharge Note: 42 yo male with HTN, DM on insulin, recently had patella fracture repair, recently started  on ramipril, in ED with hives, upper lip swelling and swelling to right arm.  Treated with steroids in ED and had negative right UE DVT study.  Never had airway compromise.  Placed in CDU for monitoring.  This morning on my eval pt feeling improvement, states swelling to right arm and lips is improved, no longer has hives.  Breathing comfortably.  On exam pt well appearing, in NAD, heart RRR, lungs CTAB, abd NTND, strength 5/5 in all extremities and skin without rash.  Right arm mild generalized swelling compared to left arm but no erythema, no rash, no skin breaks and pt still with full ROM to right shoulder/elbow/wrist/hand with strong radial pulse.  Compartments soft.  Throat clear, no stridor.  Pt seen by endocrinology in ED due to hyperglycemia in the setting of steroid use--recommendations made for insulin adjustment while on steroids.  Pt stable for discharge with outpatient followup.  Pt advised to stop his ACE-I, unclear if it contributed to presentation.

## 2018-08-05 NOTE — ED CDU PROVIDER DISPOSITION NOTE - CLINICAL COURSE
Dr. Phelan: 44 yo male with HTN, DM on insulin, recently had patella fracture repair, recently started  on ramipril, in ED with hives, upper lip swelling and swelling to right arm.  Treated with steroids in ED and had negative right UE DVT study.  Never had airway compromise.  Placed in CDU for monitoring.  This morning on my eval pt feeling improvement, states swelling to right arm and lips is improved, no longer has hives.  Breathing comfortably.  On exam pt well appearing, in NAD, heart RRR, lungs CTAB, abd NTND, strength 5/5 in all extremities and skin without rash.  Right arm mild generalized swelling compared to left arm but no erythema, no rash, no skin breaks and pt still with full ROM to right shoulder/elbow/wrist/hand with strong radial pulse.  Compartments soft.  Throat clear, no stridor.  Pt seen by endocrinology in ED due to hyperglycemia in the setting of steroid use--recommendations made for insulin adjustment while on steroids.  Pt stable for discharge with outpatient followup.  Pt advised to stop his ACE-I, unclear if it contributed to presentation.

## 2018-08-05 NOTE — ED CDU PROVIDER SUBSEQUENT DAY NOTE - PROGRESS NOTE DETAILS
Pt reassessed, feels much better, lip swelling and R arm swelling have resolved. No rash/urticaria. Pt seen by endocrine who recommend new insulin regimen of Lantus 28U qHs, Humalog 14U while on steroids (then decrease Humalog to 9U TID). Will stop Ramipril and start Norvasc instead. DC on Prednisone 50mg x 5 days. Pt ok w/ plan

## 2018-08-05 NOTE — CONSULT NOTE ADULT - SUBJECTIVE AND OBJECTIVE BOX
HPI:      PAST MEDICAL & SURGICAL HISTORY:  Hypertension: not on medication  Nondisplaced comminuted fracture of right patella, initial encounter for closed fracture  Hyperlipidemia: PCP prescribed statin, non-compliant, refusing to take  Diabetes: TYPE II, insulin dependent, 2014  No significant past surgical history      FAMILY HISTORY:  Diabetes (Mother)      Social History:    Outpatient Medications:    MEDICATIONS  (STANDING):  dextrose 5%. 1000 milliLiter(s) (50 mL/Hr) IV Continuous <Continuous>  dextrose 50% Injectable 12.5 Gram(s) IV Push once  dextrose 50% Injectable 25 Gram(s) IV Push once  dextrose 50% Injectable 25 Gram(s) IV Push once  diphenhydrAMINE   Injectable 25 milliGRAM(s) IV Push every 8 hours  famotidine Injectable 20 milliGRAM(s) IV Push every 12 hours  insulin glargine Injectable (LANTUS) 28 Unit(s) SubCutaneous at bedtime  insulin lispro (HumaLOG) corrective regimen sliding scale   SubCutaneous three times a day before meals  insulin lispro (HumaLOG) corrective regimen sliding scale   SubCutaneous at bedtime  sodium chloride 0.9%. 1000 milliLiter(s) (125 mL/Hr) IV Continuous <Continuous>    MEDICATIONS  (PRN):  dextrose 40% Gel 15 Gram(s) Oral once PRN Blood Glucose LESS THAN 70 milliGRAM(s)/deciliter  glucagon  Injectable 1 milliGRAM(s) IntraMuscular once PRN Glucose LESS THAN 70 milligrams/deciliter      Allergies    No Known Allergies    Intolerances      Review of Systems:  Constitutional: No fever  Eyes: No blurry vision  Neuro: No tremors  HEENT: No pain  Cardiovascular: No chest pain, palpitations  Respiratory: No SOB, no cough  GI: No nausea, vomiting, abdominal pain  : No dysuria  Skin: no rash  Psych: no depression  Endocrine: no polyuria, polydipsia  Hem/lymph: no swelling  Osteoporosis: no fractures    ALL OTHER SYSTEMS REVIEWED AND NEGATIVE    UNABLE TO OBTAIN    PHYSICAL EXAM:  VITALS: T(C): 36.8 (08-05-18 @ 04:15)  T(F): 98.2 (08-05-18 @ 04:15), Max: 99 (08-04-18 @ 13:15)  HR: 82 (08-05-18 @ 04:15) (82 - 101)  BP: 135/75 (08-05-18 @ 04:15) (123/61 - 137/78)  RR:  (16 - 18)  SpO2:  (98% - 100%)  Wt(kg): --  GENERAL: NAD, well-groomed, well-developed  EYES: No proptosis, no lid lag, anicteric  HEENT:  Atraumatic, Normocephalic, moist mucous membranes  THYROID: Normal size, no palpable nodules  RESPIRATORY: Clear to auscultation bilaterally; No rales, rhonchi, wheezing, or rubs  CARDIOVASCULAR: Regular rate and rhythm; No murmurs; no peripheral edema  GI: Soft, nontender, non distended, normal bowel sounds  SKIN: Dry, intact, No rashes or lesions  MUSCULOSKELETAL: Full range of motion, normal strength  NEURO: sensation intact, extraocular movements intact, no tremor, normal reflexes  PSYCH: Alert and oriented x 3, normal affect, normal mood  CUSHING'S SIGNS: no striae    POCT Blood Glucose.: 252 mg/dL (08-05-18 @ 09:24)  POCT Blood Glucose.: 287 mg/dL (08-05-18 @ 07:37)  POCT Blood Glucose.: 310 mg/dL (08-04-18 @ 21:17)  POCT Blood Glucose.: 403 mg/dL (08-04-18 @ 18:15)  POCT Blood Glucose.: 514 mg/dL (08-04-18 @ 17:36)  POCT Blood Glucose.: 472 mg/dL (08-04-18 @ 13:54)  POCT Blood Glucose.: 375 mg/dL (08-04-18 @ 12:18)  POCT Blood Glucose.: 133 mg/dL (08-04-18 @ 06:58)                            9.3    13.21 )-----------( 328      ( 05 Aug 2018 07:37 )             29.2       08-05    141  |  109<H>  |  54<H>  ----------------------------<  276<H>  4.9   |  21<L>  |  1.75<H>    EGFR if : 54  EGFR if non : 47    Ca    8.6      08-05    TPro  6.4  /  Alb  3.3  /  TBili  0.3  /  DBili  x   /  AST  10  /  ALT  14  /  AlkPhos  65  08-05      Thyroid Function Tests:      Hemoglobin A1C, Whole Blood: 7.0 % <H> [4.0 - 5.6] (08-04-18 @ 07:22)  Hemoglobin A1C, Whole Blood: 6.8 % <H> [4.0 - 5.6] (07-26-18 @ 07:30)          Radiology: HPI: 43 yr M with T2DM diagnosed 4 years ago here with angioedema and R arm swelling due to ACEi. Patient follows with endo Dr Pham. He takes Lantus 28U BID at home. His DM is complicated by neuropathy and CKD-3. No other micro or macrovascular complications. Patient consumes a lot of rice in his diet. He has never been hospitalized for DKA/HHS. His DM is well controlled at home. His FS in the AM 90s-140s. Has rare hypoglycemia His A1C was found to be 7.0. Patient received solumedrol 125mg IV yesterday and 60mg IV in the AM today.       PAST MEDICAL & SURGICAL HISTORY:  Hypertension: not on medication  Nondisplaced comminuted fracture of right patella, initial encounter for closed fracture  Hyperlipidemia: PCP prescribed statin, non-compliant, refusing to take  Diabetes: TYPE II, insulin dependent, 2014  No significant past surgical history      FAMILY HISTORY:  Diabetes (Mother)      Social History: +smoker    Outpatient Medications: Lantus 28U BID, Ramipril    MEDICATIONS  (STANDING):  dextrose 5%. 1000 milliLiter(s) (50 mL/Hr) IV Continuous <Continuous>  dextrose 50% Injectable 12.5 Gram(s) IV Push once  dextrose 50% Injectable 25 Gram(s) IV Push once  dextrose 50% Injectable 25 Gram(s) IV Push once  diphenhydrAMINE   Injectable 25 milliGRAM(s) IV Push every 8 hours  famotidine Injectable 20 milliGRAM(s) IV Push every 12 hours  insulin glargine Injectable (LANTUS) 28 Unit(s) SubCutaneous at bedtime  insulin lispro (HumaLOG) corrective regimen sliding scale   SubCutaneous three times a day before meals  insulin lispro (HumaLOG) corrective regimen sliding scale   SubCutaneous at bedtime  sodium chloride 0.9%. 1000 milliLiter(s) (125 mL/Hr) IV Continuous <Continuous>    MEDICATIONS  (PRN):  dextrose 40% Gel 15 Gram(s) Oral once PRN Blood Glucose LESS THAN 70 milliGRAM(s)/deciliter  glucagon  Injectable 1 milliGRAM(s) IntraMuscular once PRN Glucose LESS THAN 70 milligrams/deciliter      Allergies    No Known Allergies    Intolerances      Review of Systems:  Constitutional: No fever  Eyes: No blurry vision  Neuro: No tremors  HEENT: + lip swelling  Cardiovascular: No chest pain, palpitations  Respiratory: No SOB, no cough  GI: No nausea, vomiting, abdominal pain  : No dysuria  Skin: R arm swelling and hives  Psych: no depression  Endocrine: no polyuria, polydipsia  Hem/lymph: no swelling  Osteoporosis: no fractures    ALL OTHER SYSTEMS REVIEWED AND NEGATIVE        PHYSICAL EXAM:  VITALS: T(C): 36.8 (08-05-18 @ 04:15)  T(F): 98.2 (08-05-18 @ 04:15), Max: 99 (08-04-18 @ 13:15)  HR: 82 (08-05-18 @ 04:15) (82 - 101)  BP: 135/75 (08-05-18 @ 04:15) (123/61 - 137/78)  RR:  (16 - 18)  SpO2:  (98% - 100%)  Wt(kg): --  GENERAL: NAD, well-groomed, well-developed  EYES: No proptosis, no lid lag, anicteric  HEENT:  Atraumatic, Normocephalic,  mild lower lip swelling  THYROID: Normal size, no palpable nodules  RESPIRATORY: Clear to auscultation bilaterally; No rales, rhonchi, wheezing, or rubs  CARDIOVASCULAR: Regular rate and rhythm; No murmurs; no peripheral edema  GI: Soft, nontender, non distended, normal bowel sounds  SKIN: D+ R arm swelling  MUSCULOSKELETAL: R knee brace  NEURO: sensation intact, extraocular movements intact, no tremor, normal reflexes  PSYCH: Alert and oriented x 3, normal affect, normal mood    POCT Blood Glucose.: 252 mg/dL (08-05-18 @ 09:24)  POCT Blood Glucose.: 287 mg/dL (08-05-18 @ 07:37)  POCT Blood Glucose.: 310 mg/dL (08-04-18 @ 21:17)  POCT Blood Glucose.: 403 mg/dL (08-04-18 @ 18:15)  POCT Blood Glucose.: 514 mg/dL (08-04-18 @ 17:36)  POCT Blood Glucose.: 472 mg/dL (08-04-18 @ 13:54)  POCT Blood Glucose.: 375 mg/dL (08-04-18 @ 12:18)  POCT Blood Glucose.: 133 mg/dL (08-04-18 @ 06:58)                            9.3    13.21 )-----------( 328      ( 05 Aug 2018 07:37 )             29.2       08-05    141  |  109<H>  |  54<H>  ----------------------------<  276<H>  4.9   |  21<L>  |  1.75<H>    EGFR if : 54  EGFR if non : 47    Ca    8.6      08-05    TPro  6.4  /  Alb  3.3  /  TBili  0.3  /  DBili  x   /  AST  10  /  ALT  14  /  AlkPhos  65  08-05        Hemoglobin A1C, Whole Blood: 7.0 % <H> [4.0 - 5.6] (08-04-18 @ 07:22)  Hemoglobin A1C, Whole Blood: 6.8 % <H> [4.0 - 5.6] (07-26-18 @ 07:30)

## 2018-08-06 LAB — TRYPTASE SERPL-MCNC: 14.7 NG/ML — HIGH

## 2018-08-14 ENCOUNTER — APPOINTMENT (OUTPATIENT)
Dept: ORTHOPEDIC SURGERY | Facility: CLINIC | Age: 43
End: 2018-08-14
Payer: COMMERCIAL

## 2018-08-14 PROCEDURE — 99024 POSTOP FOLLOW-UP VISIT: CPT

## 2018-09-05 ENCOUNTER — APPOINTMENT (OUTPATIENT)
Dept: ORTHOPEDIC SURGERY | Facility: CLINIC | Age: 43
End: 2018-09-05
Payer: COMMERCIAL

## 2018-09-05 ENCOUNTER — APPOINTMENT (OUTPATIENT)
Dept: ORTHOPEDIC SURGERY | Facility: CLINIC | Age: 43
End: 2018-09-05

## 2018-09-05 PROCEDURE — 99024 POSTOP FOLLOW-UP VISIT: CPT

## 2018-09-05 PROCEDURE — 73562 X-RAY EXAM OF KNEE 3: CPT | Mod: RT

## 2018-09-08 RX ORDER — BLOOD SUGAR DIAGNOSTIC
STRIP MISCELLANEOUS
Qty: 50 | Refills: 0 | Status: ACTIVE | COMMUNITY
Start: 2018-02-28

## 2018-09-08 RX ORDER — AMLODIPINE BESYLATE 5 MG/1
5 TABLET ORAL
Qty: 30 | Refills: 0 | Status: ACTIVE | COMMUNITY
Start: 2018-08-05

## 2018-09-08 RX ORDER — INSULIN GLARGINE 100 [IU]/ML
100 INJECTION, SOLUTION SUBCUTANEOUS
Qty: 12 | Refills: 0 | Status: ACTIVE | COMMUNITY
Start: 2018-07-02

## 2018-09-08 RX ORDER — PREDNISONE 50 MG/1
50 TABLET ORAL
Qty: 5 | Refills: 0 | Status: ACTIVE | COMMUNITY
Start: 2018-08-05

## 2018-09-08 RX ORDER — INSULIN LISPRO 100 [IU]/ML
100 INJECTION, SOLUTION INTRAVENOUS; SUBCUTANEOUS
Qty: 12 | Refills: 0 | Status: ACTIVE | COMMUNITY
Start: 2018-08-05

## 2018-09-08 RX ORDER — ASPIRIN 325 MG/1
325 TABLET ORAL
Qty: 60 | Refills: 0 | Status: ACTIVE | COMMUNITY
Start: 2018-07-27

## 2018-09-08 RX ORDER — OXYCODONE AND ACETAMINOPHEN 5; 325 MG/1; MG/1
5-325 TABLET ORAL
Qty: 42 | Refills: 0 | Status: ACTIVE | COMMUNITY
Start: 2018-07-27

## 2018-09-08 RX ORDER — CHOLECALCIFEROL (VITAMIN D3) 10(400)/ML
32G X 4 MM DROPS ORAL
Qty: 100 | Refills: 0 | Status: ACTIVE | COMMUNITY
Start: 2018-08-05

## 2018-09-08 RX ORDER — SIMVASTATIN 10 MG/1
10 TABLET, FILM COATED ORAL
Qty: 30 | Refills: 0 | Status: ACTIVE | COMMUNITY
Start: 2018-02-28

## 2018-09-08 RX ORDER — BLOOD-GLUCOSE METER
EACH MISCELLANEOUS
Qty: 100 | Refills: 0 | Status: ACTIVE | COMMUNITY
Start: 2017-12-05

## 2018-09-08 RX ORDER — RAMIPRIL 5 MG/1
5 CAPSULE ORAL
Qty: 30 | Refills: 0 | Status: ACTIVE | COMMUNITY
Start: 2018-07-29

## 2018-10-03 ENCOUNTER — APPOINTMENT (OUTPATIENT)
Dept: ORTHOPEDIC SURGERY | Facility: CLINIC | Age: 43
End: 2018-10-03
Payer: MEDICAID

## 2018-10-03 PROCEDURE — 99024 POSTOP FOLLOW-UP VISIT: CPT

## 2018-10-03 PROCEDURE — 73562 X-RAY EXAM OF KNEE 3: CPT | Mod: RT

## 2018-10-05 RX ORDER — INSULIN LISPRO 100 [IU]/ML
(75-25) 100 INJECTION, SUSPENSION SUBCUTANEOUS
Qty: 6 | Refills: 0 | Status: ACTIVE | COMMUNITY
Start: 2018-09-23

## 2018-11-08 NOTE — ED ADULT NURSE NOTE - CHIEF COMPLAINT QUOTE
pt arrives w/ c/o allergic reaction to arm and face. pt with urticaria to bilateral arms. pt with swelling to top lip. pt with recent right knee surgery last Friday. pt states gvn simvastatin, percocet, ramipril, and nicotine patch which are new meds for him. pt appears comfortable no signs of drooling or respiratory distress. [Headache] : headache [see HPI] : see HPI [Negative] : Heme/Lymph

## 2019-01-08 ENCOUNTER — APPOINTMENT (OUTPATIENT)
Dept: ORTHOPEDIC SURGERY | Facility: CLINIC | Age: 44
End: 2019-01-08
Payer: MEDICAID

## 2019-01-08 VITALS
HEIGHT: 66 IN | HEART RATE: 109 BPM | BODY MASS INDEX: 29.25 KG/M2 | WEIGHT: 182 LBS | DIASTOLIC BLOOD PRESSURE: 91 MMHG | SYSTOLIC BLOOD PRESSURE: 148 MMHG

## 2019-01-08 DIAGNOSIS — S82.044A NONDISPLACED COMMINUTED FRACTURE OF RIGHT PATELLA, INITIAL ENCOUNTER FOR CLOSED FRACTURE: ICD-10-CM

## 2019-01-08 PROCEDURE — 99213 OFFICE O/P EST LOW 20 MIN: CPT

## 2019-01-08 PROCEDURE — 73562 X-RAY EXAM OF KNEE 3: CPT | Mod: RT

## 2019-01-08 NOTE — HISTORY OF PRESENT ILLNESS
[Chills] : no chills [Fever] : no fever [Nausea] : no nausea [Vomiting] : no vomiting [Clean/Dry/Intact] : clean, dry and intact [Healed] : healed [Erythema] : not erythematous [Neuro Intact] : an unremarkable neurological exam [Vascular Intact] : ~T peripheral vascular exam normal [Negative Philip's] : maneuvers demonstrated a negative Philip's sign [Doing Well] : is doing well [Excellent Pain Control] : has excellent pain control [No Sign of Infection] : is showing no signs of infection [None] : None [de-identified] : 5 months postop [de-identified] : 5 months status post Partial distal pole patellectomy due to  a right patella fracture. He is taking ibuprofen for pain. He has occasional pain in his knee when walking longer distances. However, he is doing well [de-identified] : Incision is fully healed. No tenderness over the patella. Knee range of motion is zero-120°. He can straight leg raise with 4/5 strength. There is significant quadricep atrophy. Hamstring strength is 4/5. [de-identified] : 3 views of the right knee taken today and reviewed by me demonstrate maintenance of alignment. The patella as its appropriate relationship to the femur [de-identified] : He is doing well. He is encouraged to be more aggressive with his home exercise program. His quadriceps and his hand has continued to be significantly weak and these are the main source of his issues. He is given a home exercise program. He is given Theraband. He is given a physical therapy prescription. We'll see him back in 3 months

## 2019-08-12 NOTE — ED ADULT NURSE REASSESSMENT NOTE - NS ED NURSE REASSESS COMMENT FT1
Pt.is scheduled for Oct.27th @ [facility] fs 375 pt admitrs to eating heavy meal 1 hr ago/ report given to cdu/ Xelsafiaz Pregnancy And Lactation Text: This medication is Pregnancy Category D and is not considered safe during pregnancy.  The risk during breast feeding is also uncertain.

## 2021-03-09 NOTE — ED CDU PROVIDER INITIAL DAY NOTE - NS_EDPROVIDERDISPOUSERTYPE_ED_A_ED
Report and care of patient given to Anurag Thompson RN   Attending Attestation (For Attendings USE Only)...

## 2021-10-28 NOTE — H&P PST ADULT - CORNEAL ABRASION RISK
PROCEDURES:  Right salpingoophorectomy 28-Oct-2021 16:27:19  Radha Pennington  
Denies hx of corneal abrasion.

## 2024-02-22 NOTE — H&P PST ADULT - NEGATIVE LYMPHATIC SYMPTOMS
Initiate Treatment: OTC Benzoyl Peroxide Wash(5% or 10%)alternate with Salicylic acid\\nClindamycin 1% Solution BID\\nTretinoin 0.025% Cream 3x a week, increase to nightly as tolerated.
Detail Level: Zone
Plan: Primarily comedonal acne.
Render In Strict Bullet Format?: No
Continue Regimen: hydrocortisone 2.5 % topical ointment BID, fluocinonide 0.05 % topical solution BID
no tender lymph nodes/no enlarged lymph nodes

## 2024-04-18 NOTE — ED PROVIDER NOTE - SIGN-OUT TIME
Discharge Summary    Patient Name Misty Vera   MRN: [id number]   YOB: 1974       Admit date: 4/15/2024   Discharge date:  4/18/2024       Disposition:                   {disposition:299997}    Admitting Physician: Jamal Thayer DO   Primary care provider: Abby Alejandre DO  Discharge Physician: Javier Mathias MD    Primary Discharge Diagnoses:  ***    Principal Problem:    Pyelonephritis, acute  Active Problems:    Severe sepsis (CMD)  Resolved Problems:    * No resolved hospital problems. *      Past Medical History:    Anxiety                                                       Migraine                                                      Fracture of left ankle                                          Comment: as child    Onychomycosis                                                 Obesity (BMI 30.0-34.9)                         09/24/2015    Depression                                      09/24/2015    Hyperlipidemia                                  09/24/2015    Vitamin D deficiency                            09/24/2015    Gastroesophageal reflux disease                 09/24/2015    Allergic rhinitis                               09/24/2015    Colon polyps                                    02/07/2019      Comment: 1 yr recall, tubular adenoma & tubulocillous                polyps/ Tori    IUD (intrauterine device) in place              09/09/2015      Comment: Mirena IUD placed 4/2/12     Hx of colonoscopy                               02/07/2019      Comment:  1 year   due to your history of colon polyps.                Dr Valle     Liver fibrosis                                  07/26/2021      Comment: on liver US due to alcohol abuse    Colon polyps                                    11/09/2021      Comment: 5 yr recall, tubular adenoma / Dr. Hanson    Duodenitis determined by biopsy                 02/02/2023      Comment: EGD    Gastritis determined by biopsy                   02/02/2023      Comment: EGD    Hiatal hernia                                   02/02/2023      Comment: EGD    Asthma                                                        HTN (hypertension)                                            Wears glasses                                                 Ambulates with cane                                           Abdominal pain                                                Diarrhea                                                      Alcohol-induced pancreatitis                                  Hypomagnesemia                                                Cyst and pseudocyst of pancreas                               Nausea and vomiting                             07/27/2021    PAST MEDICAL HISTORY                            2023            Comment: Chronic calcified pancreatitis    Pancreatic duct stricture                       09/14/2023    Consultations:  IP CONSULT TO ELECTROPHYSIOLOGY  IP CONSULT TO INFECTIOUS DISEASES   {GSR Consults (Optional):574619}  Transfusions:  {TXP Yes No Transfusions:4915651}  Procedures:  {YES/NO:580483}    Hospital Course:    ***      On the day of discharge, patient was seen and examined at the bedside.  No new concerns/complaints voiced.    ***  Denies any {r symptoms:362452::\"Fever\",\"Chest pain\",\"Shortness of breath\",\"Cough\",\"Abdominal pain\",\"Nausea\",\"Vomiting\",\" symptoms\"}.      Code status: Full Resuscitation    Discharge Labs:  Recent Labs   Lab 04/18/24  0637 04/17/24  1452 04/17/24  0650 04/16/24  0629 04/16/24  0628 04/16/24  0312 04/16/24  0022 04/15/24  1329 04/15/24  0735   SODIUM 140  --  137  --  136  --   --   --  139   POTASSIUM 4.1 3.8 3.5  --  3.8  --    < >  --  3.4   CHLORIDE 105  --  102  --  104  --   --   --  98   CO2 25  --  26  --  24  --   --   --  26   BUN 6  --  10  --  14  --   --   --  18   CREATININE 0.62  --  0.75  --  0.81  --   --   --  1.08*   GLUCOSE 94  --  92  --  95  --   --   --  132*   MG 1.7   --  1.8  --  2.0  --   --   --  1.8   WBC 6.7  --  9.3 13.5*  --   --   --   --  15.9*   HGB 10.5*  --  10.3* 9.1*  --   --   --   --  12.4   HCT 33.1*  --  31.7* 28.0*  --   --   --   --  39.0     --  213 174  --   --   --   --  233   PT  --   --   --   --   --  13.4*  --   --  11.8   INR  --   --   --   --   --  1.3  --   --  1.1   PTT  --   --   --   --   --  33*  --  30  --     < > = values in this interval not displayed.       Microbiology Results  (Last 10 results in the past 7 days)      Specimen   Gram Smear   Culture Result   Status       04/15/24  1227         Gram negative bacilli.  Comment: Detected from anaerobic bottle after 10 hours.   Detected from aerobic bottle after 23 hours.               04/15/24  1204         Gram negative bacilli.  Comment: Detected from anaerobic bottle after 8 hours.  Detected from aerobic bottle after 14 hours.                    Significant Diagnostic Studies and Procedures:  CT ABDOMEN PELVIS W CONTRAST    Result Date: 4/15/2024  Narrative: CT ABDOMEN PELVIS W CONTRAST CLINICAL INFORMATION: abd pain COMPARISON: CT abdomen pelvis 3/5/2024 and 10/20/2023. TECHNIQUE:    Axial images of the abdomen and pelvis; sagittal and coronal reformatted images.   IV contrast:  Omnipaque 300  100 mL.   Oral contrast:  None.   FINDINGS: LOWER CHEST    No focal consolidation or pleural effusion within the visualized lung bases. The heart is within normal limits for size. No pericardial effusion. UPPER ABDOMEN   Liver and bile ducts:  Unremarkable.   Gallbladder:  Gallbladder in situ.   Pancreas: Scattered pancreatic calcifications, may be compatible with chronic pancreatitis..   Spleen:  Unremarkable.     RETROPERITONEUM   Adrenals:  Unremarkable.   Kidneys and ureters: Mild bilateral perinephric fat stranding with suspected enhancement of the bilateral ureters.   GASTROINTESTINAL TRACT and MESENTERY   The visualized distal esophagus and stomach are within normal limits. No  abnormally dilated or thickened loops of bowel.  The appendix is within normal limits. MISCELLANEOUS   No free intraperitoneal air or fluid is seen.  No discrete lymphadenopathy. VASCULATURE   The abdominal aorta is normal in caliber.  There are atherosclerotic calcifications of the abdominal aorta. PELVIS The bladder is decompressed.. Multiple calcified uterine fibroids. Complex cystic lesion with laboratory is redemonstrated measuring up to 3.0 cm, similar compared to prior. No pelvic lymphadenopathy or free fluid. BONES/SOFT TISSUES   No acute osseous abnormality or suspicious osseous lesion.     Impression: Impression: 1. Nonspecific mild bilateral perinephric fat stranding with suspected enhancement of the bilateral ureters. This is new in comparison to prior CT 3/5/2024. Findings are nonspecific though may be suggestive of underlying infectious process such as ureteritis or pyelonephritis. 2. Chronic pancreatitis changes. Electronically Signed by: Gibson Hutchinson MD Signed on: 4/15/2024 11:10 AM Created on Workstation ID: [id number] Signed on Workstation ID: [id number]    XR CHEST AP OR PA    Result Date: 4/15/2024  Narrative: XR CHEST AP OR PA HISTORY:  49 years-old Female, cough. COMPARISON: 3/5/2024 chest radiography. FINDINGS: The cardiomediastinal contours are within normal limits for portable AP radiography. No pleural effusion or pneumothorax. Lungs are clear.     Impression: IMPRESSION: 1. No acute cardiopulmonary disease. Electronically Signed by: Gibson Falcon MD Signed on: 4/15/2024 7:54 AM Created on Workstation ID: [id number] Signed on Workstation ID: [id number]      Pending  Results:  Unresulted Labs (From admission, onward)       Start     Ordered    04/19/24 0600  Magnesium  AM DRAW,   Routine         04/18/24 0722    04/17/24 0600  Renal Panel  AM DRAW,   Routine       04/16/24 2259 04/17/24 0600  Magnesium  AM DRAW,   Routine       04/16/24 2259 04/17/24 0600  CBC with Automated  Differential  AM DRAW,   Routine       04/16/24 2259                  Unresulted Procedure (From admission, onward)      None            Discharge Exam:    Blood pressure 123/66, pulse 65, temperature 97.7 °F (36.5 °C), temperature source Oral, resp. rate 18, height 5' 5\" (1.651 m), weight 78.7 kg (173 lb 8 oz), last menstrual period 09/21/2023, SpO2 98%, not currently breastfeeding.  General: {gsr exam looks (Optional):220002::\"Looks well\"}{gsr exam general (Optional):435891::\"no apparent distress\",\"well developed, well nourished\"}  Neck: {gsr exam neck (Optional):859811::\"Supple\"}  HEENT: {gsr exam HEENT (Optional):034509::\"normocephalic and atraumatic\",\"sclerae are anicteric\",\"EOM intact\"}  CV: {gsr exam cardio (Optional):651709::\"regular rate and rhythm\",\"S1, S2 normal, \",\"no murmurs, rubs, or thrills\",\"Dorsalis pedis pulses palpable\"}  Resp: {gsr exam Respiratory (Optional):903175::\"clear to auscultation bilaterally\",\"no accessory muscle use \"}  Abd: {gsr exam abdomen (Optional):971662::\"soft\",\"nontender\",\"nondistended\",\"bowel sounds present\"}  Ext: {gsr exam extremities (Optional):342876::\"No deformity\",\"palpable Dorsalis pedis pulses\",\"moving all 4 extremities spontaneously\"}  Skin: {gsr exam skin (Optional):548944::\"no rashes, lesions, or ulcers noted\",\"warm to touch\"}  Neuro: {gsr exam neuro (Optional):139131::\"CN 2-12 grossly intact\",\"no focal deficits noted\"}  Psych: {gsr exam psych:010255::\"oriented to time, place and person\",\"normal judgement and insight\",\"normal mood and affect\"}     Patient Discharge Instructions:   1. Activity: {discharge activity:6827122}  2. Diet:   Regular Diet  2 Times/Day W Breakfast & Dinner; Ensure Clear Apple/Clear Liquid Supplement, Apple Oral Nutrition Supplement  Daily W Lunch; Ensure Clear Berry/Clear Liquid Supplement, Berry Oral Nutrition Supplement  {[facility] diet (Optional):757143}  3. Wound Care:       4. Discharge Medications:  Current Discharge Medication List         START taking these medications    Details   amoxicillin-clavulanate (AUGMENTIN) 875-125 MG per tablet Take 1 tablet by mouth every 12 hours for 12 days.  Qty: 24 tablet, Refills: 0           CONTINUE these medications which have NOT CHANGED    Details   Acetaminophen-DM (CORICIDIN HBP COLD/COUGH/FLU PO) Take 30 mLs by mouth every 6 hours as needed.      ALPRAZolam (XANAX) 0.25 MG tablet Take 1 tablet by mouth 2 times daily as needed for Anxiety.  Qty: 30 tablet, Refills: 0      zolpidem (AMBIEN) 5 MG tablet Take 1 tablet by mouth nightly as needed for Sleep.  Qty: 30 tablet, Refills: 0      tizanidine (ZANAFLEX) 2 MG capsule Take 1 capsule by mouth 3 times daily as needed for Muscle spasms.  Qty: 20 capsule, Refills: 0      gabapentin (NEURONTIN) 300 MG capsule Take 2 capsules by mouth in the morning and 2 capsules at noon and 2 capsules in the evening.  Qty: 180 capsule, Refills: 5      ondansetron (ZOFRAN ODT) 4 MG disintegrating tablet Place 1 tablet onto the tongue every 12 hours as needed for Nausea.  Qty: 90 tablet, Refills: 5      amLODIPine (NORVASC) 10 MG tablet Take 1 tablet by mouth daily.  Qty: 30 tablet, Refills: 5      pantoprazole (PROTONIX) 40 MG tablet Take 1 tablet by mouth every 12 hours.  Qty: 60 tablet, Refills: 5      labetalol (NORMODYNE) 100 MG tablet Take 0.5 tablets by mouth every 12 hours. Do not take when systolic blood pressure is less than 120  Qty: 30 tablet, Refills: 5      lipase-protease-amylase 24,000-76,000-120,000 units (Creon) 49978 units per capsule TAKE 1 CAPSULE BY MOUTH ONCE DAILY WITH  MEALS  AND  SNACKS  UP  TO  6  CAPS  PER  DAY  Qty: 180 capsule, Refills: 5      FOLIC ACID-B6-B12-D PO Take 1 tablet by mouth daily.      venlafaxine XR (EFFEXOR XR) 150 MG 24 hr capsule Take 1 capsule by mouth daily (with breakfast). Replaces 75 mg daily  Qty: 90 capsule, Refills: 3      tetrahydrozoline 0.05 % ophthalmic solution Place 1 drop into both eyes as needed (dry eyes).       Probiotic Product (PROBIOTIC GUMMIES PO) Take 1 capsule by mouth nightly.      Cholecalciferol 125 mcg (5,000 units) capsule Take 1 capsule by mouth daily.  Qty: 30 capsule    Comments: 125 mcg = 5,000 units      magnesium oxide 400 (241.3 Mg) MG Tab Take 1 tablet by mouth daily.  Qty: 30 tablet, Refills: 1      acetaminophen (TYLENOL) 325 MG tablet Take 650 mg by mouth every 6 hours as needed for Pain.      loperamide (IMODIUM) 2 MG capsule Take 1 capsule by mouth as needed for Diarrhea.      fluticasone (FLONASE) 50 MCG/ACT nasal spray Spray 2 sprays in each nostril daily.  Qty: 1 each, Refills: 1    Associated Diagnoses: Rhinorrhea           STOP taking these medications       oxyCODONE, IMM REL, (ROXICODONE) 5 MG immediate release tablet Comments:   Reason for Stopping:                 ALLERGIES:  Morphine     Follow-up:  No follow-up provider specified.  Future Appointments   Date Time Provider Department Center   4/22/2024  2:00 PM Marion Newton OT [facility]   4/24/2024  2:00 PM [facility]   4/25/2024  4:25 PM Abby Alejandre DO [facility]   5/2/2024  2:00 PM Marion Newton OT [facility]   5/6/2024  4:00 PM [facility]  2 MAIN [facility]     Time spent on discharge was {TIME SPENT DISCHARGE:966974::\"greater than 30 minutes\"} minutes.  Discharge discussed with  {discussion choice Sn:860796}    Signed:  Javier Mathias MD  4/18/2024  3:54 PM     capsule, Refills: 5      FOLIC ACID-B6-B12-D PO Take 1 tablet by mouth daily.      venlafaxine XR (EFFEXOR XR) 150 MG 24 hr capsule Take 1 capsule by mouth daily (with breakfast). Replaces 75 mg daily  Qty: 90 capsule, Refills: 3      tetrahydrozoline 0.05 % ophthalmic solution Place 1 drop into both eyes as needed (dry eyes).      Probiotic Product (PROBIOTIC GUMMIES PO) Take 1 capsule by mouth nightly.      Cholecalciferol 125 mcg (5,000 units) capsule Take 1 capsule by mouth daily.  Qty: 30 capsule    Comments: 125 mcg = 5,000 units      magnesium oxide 400 (241.3 Mg) MG Tab Take 1 tablet by mouth daily.  Qty: 30 tablet, Refills: 1      acetaminophen (TYLENOL) 325 MG tablet Take 650 mg by mouth every 6 hours as needed for Pain.      loperamide (IMODIUM) 2 MG capsule Take 1 capsule by mouth as needed for Diarrhea.      fluticasone (FLONASE) 50 MCG/ACT nasal spray Spray 2 sprays in each nostril daily.  Qty: 1 each, Refills: 1    Associated Diagnoses: Rhinorrhea           STOP taking these medications       oxyCODONE, IMM REL, (ROXICODONE) 5 MG immediate release tablet Comments:   Reason for Stopping:                 ALLERGIES:  Morphine     Follow-up:  No follow-up provider specified.  Future Appointments   Date Time Provider Department Center   4/22/2024  2:00 PM Marion Newton, OT [facility]   4/24/2024  2:00 PM Betzy Sanchez, PT [facility]   4/25/2024  4:25 PM Abby Alejandre DO [facility]   5/2/2024  2:00 PM Marion Newton, OT [facility]   5/6/2024  4:00 PM [facility]  2 MAIN [facility]     Time spent on discharge was greater than 30 minutes minutes.  Discharge discussed with  patient, RN, , and consultant(s)    Signed:  Javier Mathias MD  4/18/2024  3:54 PM     22-Jul-2018 08:21

## 2025-07-15 ENCOUNTER — EMERGENCY (EMERGENCY)
Facility: HOSPITAL | Age: 50
LOS: 1 days | End: 2025-07-15
Attending: EMERGENCY MEDICINE | Admitting: EMERGENCY MEDICINE
Payer: COMMERCIAL

## 2025-07-15 VITALS
SYSTOLIC BLOOD PRESSURE: 175 MMHG | DIASTOLIC BLOOD PRESSURE: 95 MMHG | TEMPERATURE: 98 F | HEIGHT: 64 IN | HEART RATE: 67 BPM | RESPIRATION RATE: 18 BRPM | WEIGHT: 188.05 LBS | OXYGEN SATURATION: 97 %

## 2025-07-15 PROCEDURE — 90715 TDAP VACCINE 7 YRS/> IM: CPT

## 2025-07-15 PROCEDURE — 90471 IMMUNIZATION ADMIN: CPT

## 2025-07-15 PROCEDURE — 12001 RPR S/N/AX/GEN/TRNK 2.5CM/<: CPT

## 2025-07-15 PROCEDURE — 12001 RPR S/N/AX/GEN/TRNK 2.5CM/<: CPT | Mod: F2

## 2025-07-15 PROCEDURE — 99284 EMERGENCY DEPT VISIT MOD MDM: CPT | Mod: 25

## 2025-07-15 PROCEDURE — 99283 EMERGENCY DEPT VISIT LOW MDM: CPT | Mod: 25

## 2025-07-15 RX ORDER — LIDOCAINE HCL/PF 20 MG/ML
5 AMPUL, LUER TIP INJECTION ONCE
Refills: 0 | Status: COMPLETED | OUTPATIENT
Start: 2025-07-15 | End: 2025-07-15

## 2025-07-15 RX ORDER — CEPHALEXIN 250 MG/1
1 CAPSULE ORAL
Qty: 21 | Refills: 0
Start: 2025-07-15 | End: 2025-07-21

## 2025-07-15 RX ORDER — CEPHALEXIN 250 MG/1
500 CAPSULE ORAL ONCE
Refills: 0 | Status: COMPLETED | OUTPATIENT
Start: 2025-07-15 | End: 2025-07-15

## 2025-07-15 RX ADMIN — CEPHALEXIN 500 MILLIGRAM(S): 250 CAPSULE ORAL at 22:23

## 2025-07-15 RX ADMIN — Medication 5 MILLILITER(S): at 21:34

## 2025-07-15 NOTE — ED ADULT NURSE NOTE - OBJECTIVE STATEMENT
patient states he sliced left third finger on a piece of rusted metal this afternoon ~4pm. no bleeding noted at time of triage. states last tetanus shot great than 10 years ago which prompted him to come to ED. hx of Diabetes.

## 2025-07-15 NOTE — ED PROVIDER NOTE - OBJECTIVE STATEMENT
51 y/o M with hx of DM, HTN, HLD, CAD with stents on plavix and aspirin presents with c/o left third finger laceration today. Pt states that he was checking his car exhaust and a metal part cut his left 3rd finger around 4pm today. Pt is LHD. States that he is not UTD with tetanus. Denies numbness, tingling, weakness, fever or other symptoms/injuries.

## 2025-07-15 NOTE — ED PROVIDER NOTE - PROGRESS NOTE DETAILS
laceration repaired, pt tolerated well, NAD. NVI. Will d/c home with rx for keflex, wound care instructions and f/u with pcp.

## 2025-07-15 NOTE — ED PROVIDER NOTE - PATIENT PORTAL LINK FT
You can access the FollowMyHealth Patient Portal offered by [facility] by registering at the following website: [email or web address]. By joining [facility]’s FollowMyHealth portal, you will also be able to view your health information using other applications (apps) compatible with our system.

## 2025-07-15 NOTE — ED PROVIDER NOTE - MUSCULOSKELETAL, MLM
1.5cm superficial laceration noted to PIP dorsal aspect of left third finger, FROM L third finger, cap refill<2sec, no tendon involvement noted, no bony tenderness or FB noted, distal sensation intact, NVI

## 2025-07-15 NOTE — ED PROVIDER NOTE - NSFOLLOWUPINSTRUCTIONS_ED_ALL_ED_FT
Follow-up with your PCP.  Suture removal in 10 to 14 days.  Keep wound clean and dry.  Take full course of antibiotics as prescribed.  Do not remove dressing for the first 48 hours.  Apply bacitracin to wound twice daily.  If having wound redness/discharge/streaking, fever or other related symptoms, return to the ER immediately.    Sutured Wound Care  Sutures are stitches that can be used to close wounds. Some stitches break down as they heal (absorbable). Other stitches need to be taken out by your doctor (nonabsorbable). Taking good care of your wound can help to prevent pain and infection. It can also help your wound heal more quickly. Follow instructions from your doctor about how to care for your sutured wound.    Supplies needed:  Soap and water.  A clean, dry towel.  Solution to clean your wound, if needed.  A clean gauze or bandage (dressing), if needed.  Antibiotic ointment, if told by your doctor.  How to care for your sutured wound  Two stitched wounds. One is normal. The other is red with pus and infected.  Keep the wound fully dry for the first 24 hours or as long as told by your doctor. After 24–48 hours, you may shower or bathe as told by your doctor. Do not soak the wound or put the wound under water until the stitches have been taken out.  After the first 24 hours, clean the wound once a day, or as often as your doctor tells you to. Take these steps:  Wash and rinse the wound as told by your health care provider.  Pat the wound dry with a clean towel. Do not rub the wound.  After cleaning the wound, put a thin layer of antibiotic ointment on the wound as told by your doctor. This will help:  Prevent infection.  Keep the bandage from sticking to the wound.  Follow instructions from your doctor about how to change your bandage. Make sure you:  Wash your hands with soap and water for at least 20 seconds. If you cannot use soap and water, use hand .  Change your bandage at least once a day, or as often as told by your doctor. If your dressing gets wet or dirty, change it.  Leavestitches in place for at least 2 weeks. If you have skin glue over your stitches, this should also stay in place for at least 2 weeks.  Leave tape strips alone (if you have them) unless you are told to take them off. You may trim the edges of the tape strips if they curl up.  Check your wound every day for signs of infection. Watch for:  Redness, swelling, or pain.  Fluid or blood.  New warmth, a rash, or hardness at the wound site.  Pus or a bad smell.  Have the stitches taken out as told by your doctor.  Follow these instructions at home:  Medicines    Take or apply over-the-counter and prescription medicines only as told by your doctor.  If you were prescribed an antibiotic medicine or ointment, take or apply it as told by your doctor. Do not stop using the antibiotic even if you start to feel better.  General instructions    Cover your wound with clothes or put sunscreen on when you are outside. Use a sunscreen of at least 30 SPF.  Do not scratch or pick at your wound.  Avoid stretching your wound.  Raise the injured area above the level of your heart while you are sitting or lying down, if possible.  Eat a diet that includes protein, vitamin A, and vitamin C. Doing this will help your wound heal.  Drink enough fluid to keep your pee (urine) pale yellow.  Keep all follow-up visits.  Contact a doctor if:  You were given a tetanus shot and you have any of the following at the site where the needle went in:  Swelling.  Very bad pain.  Redness.  Bleeding.  Your wound breaks open.  You see something coming out of your wound, such as wood or glass.  You have any of these signs of infection in or around your wound:  Redness, swelling, or pain.  Fluid or blood.  Warmth.  A new rash.  Your wound feels hard.  You have a fever.  The skin near your wound changes color.  You have pain that does not get better with medicine.  You get numbness around the wound.  Get help right away if:  You have very bad swelling or more pain around your wound.  You have pus or a bad smell coming from your wound.  You have painful lumps near your wound or anywhere on your body.  You have a red streak going away from your wound.  The wound is on your hand or foot, and:  Your fingers or toes look pale or blue.  You cannot move a finger or toe as you used to do.  You have numbness that spreads down your hand, foot, fingers, or toes.  Summary  Sutures are stitches that are used to close wounds.  Taking good care of your wound can help to prevent pain and infection.  Keep the wound fully dry for the first 24 hours or for as long as told by your doctor. After 24–48 hours, you may shower or bathe as told by your doctor.

## 2025-07-15 NOTE — ED PROVIDER NOTE - IV ALTEPLASE EXCL ABS HIDDEN
High Dose Vitamin A Counseling: Side effects reviewed, pt to contact office should one occur. Spironolactone Counseling: Patient advised regarding risks of diarrhea, abdominal pain, hyperkalemia, birth defects (for female patients), liver toxicity and renal toxicity. The patient may need blood work to monitor liver and kidney function and potassium levels while on therapy. The patient verbalized understanding of the proper use and possible adverse effects of spironolactone.  All of the patient's questions and concerns were addressed. Benzoyl Peroxide Counseling: Patient counseled that medicine may cause skin irritation and bleach clothing.  In the event of skin irritation, the patient was advised to reduce the amount of the drug applied or use it less frequently.   The patient verbalized understanding of the proper use and possible adverse effects of benzoyl peroxide.  All of the patient's questions and concerns were addressed. Dapsone Pregnancy And Lactation Text: This medication is Pregnancy Category C and is not considered safe during pregnancy or breast feeding. Birth Control Pills Pregnancy And Lactation Text: This medication should be avoided if pregnant and for the first 30 days post-partum. Topical Sulfur Applications Counseling: Topical Sulfur Counseling: Patient counseled that this medication may cause skin irritation or allergic reactions.  In the event of skin irritation, the patient was advised to reduce the amount of the drug applied or use it less frequently.   The patient verbalized understanding of the proper use and possible adverse effects of topical sulfur application.  All of the patient's questions and concerns were addressed. Use Enhanced Medication Counseling?: No Detail Level: Zone Topical Clindamycin Pregnancy And Lactation Text: This medication is Pregnancy Category B and is considered safe during pregnancy. It is unknown if it is excreted in breast milk. Erythromycin Pregnancy And Lactation Text: This medication is Pregnancy Category B and is considered safe during pregnancy. It is also excreted in breast milk. Sarecycline Counseling: Patient advised regarding possible photosensitivity and discoloration of the teeth, skin, lips, tongue and gums.  Patient instructed to avoid sunlight, if possible.  When exposed to sunlight, patients should wear protective clothing, sunglasses, and sunscreen.  The patient was instructed to call the office immediately if the following severe adverse effects occur:  hearing changes, easy bruising/bleeding, severe headache, or vision changes.  The patient verbalized understanding of the proper use and possible adverse effects of sarecycline.  All of the patient's questions and concerns were addressed. Azithromycin Pregnancy And Lactation Text: This medication is considered safe during pregnancy and is also secreted in breast milk. Isotretinoin Counseling: Patient should get monthly blood tests, not donate blood, not drive at night if vision affected, not share medication, and not undergo elective surgery for 6 months after tx completed. Side effects reviewed, pt to contact office should one occur. Minocycline Counseling: Patient advised regarding possible photosensitivity and discoloration of the teeth, skin, lips, tongue and gums.  Patient instructed to avoid sunlight, if possible.  When exposed to sunlight, patients should wear protective clothing, sunglasses, and sunscreen.  The patient was instructed to call the office immediately if the following severe adverse effects occur:  hearing changes, easy bruising/bleeding, severe headache, or vision changes.  The patient verbalized understanding of the proper use and possible adverse effects of minocycline.  All of the patient's questions and concerns were addressed. Erythromycin Counseling:  I discussed with the patient the risks of erythromycin including but not limited to GI upset, allergic reaction, drug rash, diarrhea, increase in liver enzymes, and yeast infections. Sarecycline Pregnancy And Lactation Text: This medication is Pregnancy Category D and not consider safe during pregnancy. It is also excreted in breast milk. Topical Clindamycin Counseling: Patient counseled that this medication may cause skin irritation or allergic reactions.  In the event of skin irritation, the patient was advised to reduce the amount of the drug applied or use it less frequently.   The patient verbalized understanding of the proper use and possible adverse effects of clindamycin.  All of the patient's questions and concerns were addressed. Birth Control Pills Counseling: Birth Control Pill Counseling: I discussed with the patient the potential side effects of OCPs including but not limited to increased risk of stroke, heart attack, thrombophlebitis, deep venous thrombosis, hepatic adenomas, breast changes, GI upset, headaches, and depression.  The patient verbalized understanding of the proper use and possible adverse effects of OCPs. All of the patient's questions and concerns were addressed. Doxycycline Pregnancy And Lactation Text: This medication is Pregnancy Category D and not consider safe during pregnancy. It is also excreted in breast milk but is considered safe for shorter treatment courses. Tazorac Counseling:  Patient advised that medication is irritating and drying.  Patient may need to apply sparingly and wash off after an hour before eventually leaving it on overnight.  The patient verbalized understanding of the proper use and possible adverse effects of tazorac.  All of the patient's questions and concerns were addressed. Tetracycline Counseling: Patient counseled regarding possible photosensitivity and increased risk for sunburn.  Patient instructed to avoid sunlight, if possible.  When exposed to sunlight, patients should wear protective clothing, sunglasses, and sunscreen.  The patient was instructed to call the office immediately if the following severe adverse effects occur:  hearing changes, easy bruising/bleeding, severe headache, or vision changes.  The patient verbalized understanding of the proper use and possible adverse effects of tetracycline.  All of the patient's questions and concerns were addressed. Patient understands to avoid pregnancy while on therapy due to potential birth defects. Bactrim Pregnancy And Lactation Text: This medication is Pregnancy Category D and is known to cause fetal risk.  It is also excreted in breast milk. Topical Retinoid counseling:  Patient advised to apply a pea-sized amount only at bedtime and wait 30 minutes after washing their face before applying.  If too drying, patient may add a non-comedogenic moisturizer. The patient verbalized understanding of the proper use and possible adverse effects of retinoids.  All of the patient's questions and concerns were addressed. Isotretinoin Pregnancy And Lactation Text: This medication is Pregnancy Category X and is considered extremely dangerous during pregnancy. It is unknown if it is excreted in breast milk. Topical Retinoid Pregnancy And Lactation Text: This medication is Pregnancy Category C. It is unknown if this medication is excreted in breast milk. Tazorac Pregnancy And Lactation Text: This medication is not safe during pregnancy. It is unknown if this medication is excreted in breast milk. Topical Sulfur Applications Pregnancy And Lactation Text: This medication is Pregnancy Category C and has an unknown safety profile during pregnancy. It is unknown if this topical medication is excreted in breast milk. Spironolactone Pregnancy And Lactation Text: This medication can cause feminization of the male fetus and should be avoided during pregnancy. The active metabolite is also found in breast milk. Doxycycline Counseling:  Patient counseled regarding possible photosensitivity and increased risk for sunburn.  Patient instructed to avoid sunlight, if possible.  When exposed to sunlight, patients should wear protective clothing, sunglasses, and sunscreen.  The patient was instructed to call the office immediately if the following severe adverse effects occur:  hearing changes, easy bruising/bleeding, severe headache, or vision changes.  The patient verbalized understanding of the proper use and possible adverse effects of doxycycline.  All of the patient's questions and concerns were addressed. Dapsone Counseling: I discussed with the patient the risks of dapsone including but not limited to hemolytic anemia, agranulocytosis, rashes, methemoglobinemia, kidney failure, peripheral neuropathy, headaches, GI upset, and liver toxicity.  Patients who start dapsone require monitoring including baseline LFTs and weekly CBCs for the first month, then every month thereafter.  The patient verbalized understanding of the proper use and possible adverse effects of dapsone.  All of the patient's questions and concerns were addressed. High Dose Vitamin A Pregnancy And Lactation Text: High dose vitamin A therapy is contraindicated during pregnancy and breast feeding. Azithromycin Counseling:  I discussed with the patient the risks of azithromycin including but not limited to GI upset, allergic reaction, drug rash, diarrhea, and yeast infections. Benzoyl Peroxide Pregnancy And Lactation Text: This medication is Pregnancy Category C. It is unknown if benzoyl peroxide is excreted in breast milk. Bactrim Counseling:  I discussed with the patient the risks of sulfa antibiotics including but not limited to GI upset, allergic reaction, drug rash, diarrhea, dizziness, photosensitivity, and yeast infections.  Rarely, more serious reactions can occur including but not limited to aplastic anemia, agranulocytosis, methemoglobinemia, blood dyscrasias, liver or kidney failure, lung infiltrates or desquamative/blistering drug rashes. show

## 2025-07-15 NOTE — ED PROVIDER NOTE - ATTENDING APP SHARED VISIT CONTRIBUTION OF CARE
Ana LILLY: 42-year-old male history of diabetes, CAD, hypertension, hyperlipidemia presenting with left third finger laceration today, cut on a metal part of his car.  Not up-to-date on tetanus.  Exam notable for small well-approximated laceration to PIP left third finger.  Tetanus updated, laceration repaired, patient ready for discharge on antibiotics.

## 2025-07-15 NOTE — ED ADULT TRIAGE NOTE - CHIEF COMPLAINT QUOTE
patient states he sliced left third finger on a piece of rusted metal this afternoon ~4pm. no bleeding noted at time of triage.
